# Patient Record
Sex: FEMALE | Race: WHITE | Employment: OTHER | URBAN - METROPOLITAN AREA
[De-identification: names, ages, dates, MRNs, and addresses within clinical notes are randomized per-mention and may not be internally consistent; named-entity substitution may affect disease eponyms.]

---

## 2018-12-15 ENCOUNTER — HOSPITAL ENCOUNTER (EMERGENCY)
Age: 73
Discharge: HOME | End: 2018-12-15
Attending: EMERGENCY MEDICINE
Payer: MEDICARE

## 2018-12-15 VITALS
HEART RATE: 90 BPM | HEIGHT: 66 IN | RESPIRATION RATE: 18 BRPM | WEIGHT: 280 LBS | SYSTOLIC BLOOD PRESSURE: 217 MMHG | DIASTOLIC BLOOD PRESSURE: 82 MMHG | OXYGEN SATURATION: 97 % | BODY MASS INDEX: 45 KG/M2 | TEMPERATURE: 98.3 F

## 2018-12-15 DIAGNOSIS — Z51.89 VISIT FOR WOUND CHECK: Primary | ICD-10-CM

## 2018-12-15 LAB
BASOPHILS ABSOLUTE: 0.05 E9/L (ref 0–0.2)
BASOPHILS RELATIVE PERCENT: 1.1 % (ref 0–2)
EOSINOPHILS ABSOLUTE: 0.14 E9/L (ref 0.05–0.5)
EOSINOPHILS RELATIVE PERCENT: 3 % (ref 0–6)
HCT VFR BLD CALC: 37.3 % (ref 34–48)
HEMOGLOBIN: 12.2 G/DL (ref 11.5–15.5)
IMMATURE GRANULOCYTES #: 0.01 E9/L
IMMATURE GRANULOCYTES %: 0.2 % (ref 0–5)
LYMPHOCYTES ABSOLUTE: 0.92 E9/L (ref 1.5–4)
LYMPHOCYTES RELATIVE PERCENT: 20 % (ref 20–42)
MCH RBC QN AUTO: 27.5 PG (ref 26–35)
MCHC RBC AUTO-ENTMCNC: 32.7 % (ref 32–34.5)
MCV RBC AUTO: 84.2 FL (ref 80–99.9)
MONOCYTES ABSOLUTE: 0.5 E9/L (ref 0.1–0.95)
MONOCYTES RELATIVE PERCENT: 10.8 % (ref 2–12)
NEUTROPHILS ABSOLUTE: 2.99 E9/L (ref 1.8–7.3)
NEUTROPHILS RELATIVE PERCENT: 64.9 % (ref 43–80)
PDW BLD-RTO: 15.4 FL (ref 11.5–15)
PLATELET # BLD: 120 E9/L (ref 130–450)
PMV BLD AUTO: 9 FL (ref 7–12)
RBC # BLD: 4.43 E12/L (ref 3.5–5.5)
WBC # BLD: 4.6 E9/L (ref 4.5–11.5)

## 2018-12-15 PROCEDURE — 99283 EMERGENCY DEPT VISIT LOW MDM: CPT

## 2018-12-15 PROCEDURE — 85025 COMPLETE CBC W/AUTO DIFF WBC: CPT

## 2018-12-15 RX ORDER — SODIUM CHLORIDE 0.9 % (FLUSH) 0.9 %
10 SYRINGE (ML) INJECTION PRN
Status: DISCONTINUED | OUTPATIENT
Start: 2018-12-15 | End: 2018-12-15 | Stop reason: HOSPADM

## 2018-12-15 ASSESSMENT — ENCOUNTER SYMPTOMS
EYE REDNESS: 0
NAUSEA: 0
EYE DISCHARGE: 0
SORE THROAT: 0
SINUS PRESSURE: 0
WHEEZING: 0
SHORTNESS OF BREATH: 0
EYE PAIN: 0
BACK PAIN: 0
COUGH: 0
VOMITING: 0
DIARRHEA: 0
ABDOMINAL DISTENTION: 0

## 2018-12-15 NOTE — ED PROVIDER NOTES
follow-up. New Prescriptions    No medications on file       Diagnosis:  1. Visit for wound check        Disposition:  Patient's disposition: Discharge to home  Patient's condition is stable.          Flor Fontaine [place]  23/96/24 1098

## 2018-12-26 ENCOUNTER — HOSPITAL ENCOUNTER (OUTPATIENT)
Dept: WOUND CARE | Age: 73
Discharge: HOME | End: 2018-12-26
Payer: MEDICARE

## 2018-12-26 PROCEDURE — 99202 OFFICE O/P NEW SF 15 MIN: CPT

## 2018-12-27 NOTE — FLOWSHEET NOTE
physically assist patient in ambulating into treatment room, and/or on off chair/bed. Requires assistance to bathroom. []   2   Full Assistance Requires assistance of at least two staff members to transfer patient into treatment room and/or on/off bed/chair. \"Total Transfer\". Unable to use bathroom requires bedside commode and/or bedpan []   3       Teaching Effort Documented in [facility] Clinical Note  Effort Definition Points   No Teaching  []   0   General Initial/Simple lesson:  Assess readiness to learn, assess patient learning style to determine educational flow/special needs for learning. Teaching related to 1-3 topics  Documentation in [facility] completed. []   1   Intermediate Assess readiness to learn, assess patient learning style to determine educational flow/special needs for learning. Teaching related to 3-4 topics. Hernia belt application and care considerations  Documentation in [facility] completed. [x]   2   Complex Assess readiness to learn, assess patient learning style to determine educational flow/special needs for learning. Teaching of greater than 5 additional topics   Pre-operative ostomy education with review of written resources for patient/family/caregiver as needed. Demonstration/return demonstration of ostomy irrigation  Documentation in [facility] completed. []   3     Patient Assessment and Planning in [facility] Clinical Note   Planning Definition Points   Simple Simple pouch change procedure completed and reviewed with patient/family/caregiver   Documentation in [facility] completed. [x]   1   Intermediate Moderate level of follow-up needs:   Pouch change/discharge procedure revised and reviewed with patient/caregiver. Communications with outside resources; i.e. Telephone calls to Surgeon/ PCP, family/caregiver, home health, ECF. Documentation in [facility] completed.      []   2   Complex Complex level of instructions/changes:   Family/Caregiver learning/demonstration/return

## 2019-01-30 ENCOUNTER — HOSPITAL ENCOUNTER (OUTPATIENT)
Dept: MAMMOGRAPHY | Age: 74
Discharge: HOME | End: 2019-02-01
Payer: MEDICARE

## 2019-01-30 DIAGNOSIS — Z12.39 BREAST CANCER SCREENING: ICD-10-CM

## 2019-01-30 PROCEDURE — 77067 SCR MAMMO BI INCL CAD: CPT

## 2020-01-13 ENCOUNTER — HOSPITAL ENCOUNTER (OUTPATIENT)
Dept: ULTRASOUND IMAGING | Age: 75
Discharge: HOME | End: 2020-01-15
Payer: MEDICARE

## 2020-01-13 ENCOUNTER — HOSPITAL ENCOUNTER (OUTPATIENT)
Age: 75
Discharge: HOME | End: 2020-01-15
Payer: MEDICARE

## 2020-01-13 PROCEDURE — 93970 EXTREMITY STUDY: CPT

## 2020-02-12 ENCOUNTER — HOSPITAL ENCOUNTER (OUTPATIENT)
Dept: MAMMOGRAPHY | Age: 75
Discharge: HOME | End: 2020-02-14
Payer: MEDICARE

## 2020-02-12 PROCEDURE — 77063 BREAST TOMOSYNTHESIS BI: CPT

## 2020-03-04 ENCOUNTER — HOSPITAL ENCOUNTER (OUTPATIENT)
Dept: WOUND CARE | Age: 75
Discharge: HOME | End: 2020-03-04
Payer: MEDICARE

## 2020-03-04 PROCEDURE — 99213 OFFICE O/P EST LOW 20 MIN: CPT

## 2020-03-04 NOTE — FLOWSHEET NOTE
Clinical Level of Care Assessment    Outpatient Ostomy Care      NAME:  Maia Mtz  YOB: 1945  MEDICAL RECORD NUMBER:  [id number]   DATE:  3/4/2020      Patient Kamini Richardson Assessment- Document in Flowsheet I&O   Points   Review of chart []   0   Assess Complete Ostomy tab in Navigator for assessment of; stoma status, peristomal skin, presence of hernia/stool consistency/diet/related medications   Simple adjustments to pouch size/pouch system, new stoma pattern, accessory addition/deletion. []   1   Assess Complete Ostomy tab in Navigator for assessment of; stoma status, peristomal skin, presence of hernia/stool consistency/diet/related medications   Moderate adjustments to pouch size/pouch system, new stoma pattern, accessory addition/deletion. Observe patient/caregiver with hands-on care. 1-2 adjustments to pouch size/system/skin care/accessory addition or deletion. [x]   2   Assess Complete Ostomy tab in Navigator for assessment of; stoma status, peristomal skin, presence of hernia/stool consistency/diet/related medications   Complex adjustments to pouch size/pouch system, new stoma pattern, accessory addition/deletion. 3 or more complex adjustments to pouch size/system/skin care/accessory addition or deletion. Observe patient/caregiver with hands-on care. Assess patient/patient abdomen for optimal pre-marked stoma site. Assess patient abdomen for type of hernia belt/accessory needed. []   3         Ambulation Status Documented in [facility] Clinical Note  Status Definition Points   Independent Independently able to ambulate. Fully able (without any assistance) to get on/off exam table/chair. []   0   Minimal Physical Assistance Requires physical assistance of one person to ambulate and/or position patient to be examined. Includes necessary physical assistance to position lower extremities on/off stool.    [x]   1   Moderate Physical Assistance Requires at least one staff member to physically visit. Pouching/discharge procedure revised/reviewed with patient/family/caregiver. Contact with outside resources; i.e. communication with Surgeon/ PCP, home health, ECF. Contact/referral to ostomy appliance supplier for new or additional products. Review when to call [facility] or schedule follow-up visit. Referral to Emergency Department   Documentation in [facility] completed. []   3       Is this the Patient's First Visit with [facility] @ [facility]? No    Is this Patient Established to this [facility] within the last 3 years? Yes             Clinical Level of Care      Points  0-3  Level 1 []     Points  4-6  Level 2 []     Points  7-8  Level 3 [x]     Points  9-10  Level 4 []     Points  11-12  Level 5 []      03/04/20 1330   Wound 03/04/20 Abdomen Upper;Left   Date First Assessed/Time First Assessed: 03/04/20 1330   Present on Hospital Admission: Yes  Location: Abdomen  Wound Location Orientation: Upper;Left   Dressing Changed Changed/New   Dressing/Treatment Alginate;Hydrocolloid   Wound Cleansed Soap and water   Wound Length (cm) 1 cm   Wound Width (cm) 1 cm   Wound Depth (cm) 0.2 cm   Wound Surface Area (cm^2) 1 cm^2   Wound Volume (cm^3) 0.2 cm^3   Wound Assessment Red   Drainage Amount Scant   Drainage Description Serosanguinous   Odor None   Mira-wound Assessment Intact   Red%Wound Bed 100   Colostomy LUQ   No Placement Date or Time found. Pre-existing: Yes  Location: LUQ   Stomal Appliance 1 piece; Changed  (with convexity)   Stoma  Assessment Protrudes; Moist;Red   Peristomal Assessment   (peristomal wound)   Treatment Bag change  (skin care, opticell and duoderm to wound)   Stool Appearance Formed   Stool Color Brown   Stool Amount Small   patient using 1 piece convex pouching system  When patient sits stoma depresses, 3/9 divot in  Wound present at 10 o'clock- instructed to fill with pouch, follow with jose romeo  Will follow up next week for evaluation of

## 2020-03-11 ENCOUNTER — HOSPITAL ENCOUNTER (OUTPATIENT)
Dept: WOUND CARE | Age: 75
Discharge: HOME | End: 2020-03-11
Payer: MEDICARE

## 2020-03-11 PROCEDURE — 99212 OFFICE O/P EST SF 10 MIN: CPT

## 2020-03-11 NOTE — FLOWSHEET NOTE
Clinical Level of Care Assessment    Outpatient Ostomy Care      NAME:  Mayra Mancuso  YOB: 1945  MEDICAL RECORD NUMBER:  [id number]   DATE:  3/11/2020      Patient Leann Panda Assessment- Document in Flowsheet I&O   Points   Review of chart []   0   Assess Complete Ostomy tab in Navigator for assessment of; stoma status, peristomal skin, presence of hernia/stool consistency/diet/related medications   Simple adjustments to pouch size/pouch system, new stoma pattern, accessory addition/deletion. [x]   1   Assess Complete Ostomy tab in Navigator for assessment of; stoma status, peristomal skin, presence of hernia/stool consistency/diet/related medications   Moderate adjustments to pouch size/pouch system, new stoma pattern, accessory addition/deletion. Observe patient/caregiver with hands-on care. 1-2 adjustments to pouch size/system/skin care/accessory addition or deletion. []   2   Assess Complete Ostomy tab in Navigator for assessment of; stoma status, peristomal skin, presence of hernia/stool consistency/diet/related medications   Complex adjustments to pouch size/pouch system, new stoma pattern, accessory addition/deletion. 3 or more complex adjustments to pouch size/system/skin care/accessory addition or deletion. Observe patient/caregiver with hands-on care. Assess patient/patient abdomen for optimal pre-marked stoma site. Assess patient abdomen for type of hernia belt/accessory needed. []   3         Ambulation Status Documented in [facility] Clinical Note  Status Definition Points   Independent Independently able to ambulate. Fully able (without any assistance) to get on/off exam table/chair. []   0   Minimal Physical Assistance Requires physical assistance of one person to ambulate and/or position patient to be examined. Includes necessary physical assistance to position lower extremities on/off stool.    [x]   1   Moderate Physical Assistance Requires at least one staff member to physically assist patient in ambulating into treatment room, and/or on off chair/bed. Requires assistance to bathroom. []   2   Full Assistance Requires assistance of at least two staff members to transfer patient into treatment room and/or on/off bed/chair. \"Total Transfer\". Unable to use bathroom requires bedside commode and/or bedpan []   3       Teaching Effort Documented in [facility] Clinical Note  Effort Definition Points   No Teaching  []   0   General Initial/Simple lesson:  Assess readiness to learn, assess patient learning style to determine educational flow/special needs for learning. Teaching related to 1-3 topics  Documentation in [facility] completed. []   1   Intermediate Assess readiness to learn, assess patient learning style to determine educational flow/special needs for learning. Teaching related to 3-4 topics. Hernia belt application and care considerations  Documentation in [facility] completed. [x]   2   Complex Assess readiness to learn, assess patient learning style to determine educational flow/special needs for learning. Teaching of greater than 5 additional topics   Pre-operative ostomy education with review of written resources for patient/family/caregiver as needed. Demonstration/return demonstration of ostomy irrigation  Documentation in [facility] completed. []   3     Patient Assessment and Planning in [facility] Clinical Note   Planning Definition Points   Simple Simple pouch change procedure completed and reviewed with patient/family/caregiver   Documentation in [facility] completed. [x]   1   Intermediate Moderate level of follow-up needs:   Pouch change/discharge procedure revised and reviewed with patient/caregiver. Communications with outside resources; i.e. Telephone calls to Surgeon/ PCP, family/caregiver, home health, ECF. Documentation in [facility] completed.      []   2   Complex Complex level of instructions/changes:   Family/Caregiver learning/demonstration/return demonstration visit. Pouching/discharge procedure revised/reviewed with patient/family/caregiver. Contact with outside resources; i.e. communication with Surgeon/ PCP, home health, ECF. Contact/referral to ostomy appliance supplier for new or additional products. Review when to call [facility] or schedule follow-up visit. Referral to Emergency Department   Documentation in [facility] completed. []   3       Is this the Patient's First Visit with [facility] @ [facility]? No    Is this Patient Established to this [facility] within the last 3 years? 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   Date First Assessed/Time First Assessed: 03/04/20 1330   Present on Hospital Admission: Yes  Wound Description (Comments): evans-stomal wound   Wound Image    Dressing Changed Changed/New   Dressing/Treatment Alginate;Hydrocolloid   Wound Cleansed   (water)   Wound Length (cm) 1 cm   Wound Width (cm) 1 cm   Wound Depth (cm) 0.1 cm   Wound Surface Area (cm^2) 1 cm^2   Change in Wound Size % (l*w) 0   Wound Volume (cm^3) 0.1 cm^3   Wound Healing % 50   Wound Assessment Red   Drainage Amount Scant   Drainage Description Serosanguinous   Odor None   Evans-wound Assessment Intact   Non-staged Wound Description Partial thickness   Red%Wound Bed 100   Colostomy LUQ   No Placement Date or Time found. Pre-existing: Yes  Location: LUQ   Stomal Appliance 1 piece; Changed  (with convexity)   Stoma  Assessment Protrudes; Moist;Red   Peristomal Assessment   (evans-stomal wound 9 o'clock)   Treatment Bag change  (opticell, exuderm)   Stool Appearance Formed   Stool Color Brown   Stool Amount Small     **Informed Consent**    The patient has given verbal consent to have photos taken of wound and inserted into their chart as part of their permanent medical record for purposes of documentation, treatment management and/or medical review. All Images taken on 3/11/20 of patient name: Cassandra Gray were transmitted and stored on secured [employer] located within [employer] by a registered [employer] Device.    Using one piece convex with barrier strips  States pouch lasting 2-3 days   Mira-stomal wound improved from last visit  Will follow up in 2 weeks    Electronically signed by Conner Burton RN on 3/11/2020 at 4:09 PM

## 2020-10-19 ENCOUNTER — HOSPITAL ENCOUNTER (OUTPATIENT)
Dept: WOUND CARE | Age: 75
Discharge: HOME | End: 2020-10-19
Payer: MEDICARE

## 2020-10-19 PROCEDURE — 99212 OFFICE O/P EST SF 10 MIN: CPT

## 2020-10-19 NOTE — FLOWSHEET NOTE
Clinical Level of Care Assessment    Outpatient Ostomy Care      NAME:  Jeri Morgan  YOB: 1945  MEDICAL RECORD NUMBER:  [id number]   DATE:  10/19/2020      Patient Lisa Barron Assessment- Document in Flowsheet I&O   Points   Review of chart []   0   Assess Complete Ostomy tab in Navigator for assessment of; stoma status, peristomal skin, presence of hernia/stool consistency/diet/related medications   Simple adjustments to pouch size/pouch system, new stoma pattern, accessory addition/deletion. [x]   1   Assess Complete Ostomy tab in Navigator for assessment of; stoma status, peristomal skin, presence of hernia/stool consistency/diet/related medications   Moderate adjustments to pouch size/pouch system, new stoma pattern, accessory addition/deletion. Observe patient/caregiver with hands-on care. 1-2 adjustments to pouch size/system/skin care/accessory addition or deletion. []   2   Assess Complete Ostomy tab in Navigator for assessment of; stoma status, peristomal skin, presence of hernia/stool consistency/diet/related medications   Complex adjustments to pouch size/pouch system, new stoma pattern, accessory addition/deletion. 3 or more complex adjustments to pouch size/system/skin care/accessory addition or deletion. Observe patient/caregiver with hands-on care. Assess patient/patient abdomen for optimal pre-marked stoma site. Assess patient abdomen for type of hernia belt/accessory needed. []   3         Ambulation Status Documented in [facility] Clinical Note  Status Definition Points   Independent Independently able to ambulate. Fully able (without any assistance) to get on/off exam table/chair. []   0   Minimal Physical Assistance Requires physical assistance of one person to ambulate and/or position patient to be examined. Includes necessary physical assistance to position lower extremities on/off stool.    [x]   1   Moderate Physical Assistance Requires at least one staff member to physically assist patient in ambulating into treatment room, and/or on off chair/bed. Requires assistance to bathroom. []   2   Full Assistance Requires assistance of at least two staff members to transfer patient into treatment room and/or on/off bed/chair. \"Total Transfer\". Unable to use bathroom requires bedside commode and/or bedpan []   3       Teaching Effort Documented in [facility] Clinical Note  Effort Definition Points   No Teaching  []   0   General Initial/Simple lesson:  Assess readiness to learn, assess patient learning style to determine educational flow/special needs for learning. Teaching related to 1-3 topics  Documentation in [facility] completed. [x]   1   Intermediate Assess readiness to learn, assess patient learning style to determine educational flow/special needs for learning. Teaching related to 3-4 topics. Hernia belt application and care considerations  Documentation in [facility] completed. []   2   Complex Assess readiness to learn, assess patient learning style to determine educational flow/special needs for learning. Teaching of greater than 5 additional topics   Pre-operative ostomy education with review of written resources for patient/family/caregiver as needed. Demonstration/return demonstration of ostomy irrigation  Documentation in [facility] completed. []   3     Patient Assessment and Planning in [facility] Clinical Note   Planning Definition Points   Simple Simple pouch change procedure completed and reviewed with patient/family/caregiver   Documentation in [facility] completed. [x]   1   Intermediate Moderate level of follow-up needs:   Pouch change/discharge procedure revised and reviewed with patient/caregiver. Communications with outside resources; i.e. Telephone calls to Surgeon/ PCP, family/caregiver, home health, ECF. Documentation in [facility] completed.      []   2   Complex Complex level of instructions/changes:   Family/Caregiver learning/demonstration/return demonstration visit. Pouching/discharge procedure revised/reviewed with patient/family/caregiver. Contact with outside resources; i.e. communication with Surgeon/ PCP, home health, ECF. Contact/referral to ostomy appliance supplier for new or additional products. Review when to call [facility] or schedule follow-up visit. Referral to Emergency Department   Documentation in [facility] completed. []   3       Is this the Patient's First Visit with [facility] @ [facility]? No    Is this Patient Established to this [facility] within the last 3 years? Yes             Clinical Level of Care      Points  0-3  Level 1 []     Points  4-6  Level 2 [x]     Points  7-8  Level 3 []     Points  9-10  Level 4 []     Points  11-12  Level 5 []        10/19/20 1329   Colostomy LUQ   No Placement Date or Time found. Pre-existing: Yes  Location: LUQ   Stomal Appliance 1 piece; Changed  (with convexity)   Stoma  Assessment Protrudes; Moist;Red   Peristomal Assessment Intact   Treatment Bag change  (skin care)   Output (mL) 0 ml   Patient called voicing she was bleeding from her stoma and having clots  I instructed her to notify the physician  In turn physician want ed her to se ET nurse  No abnormal finding visibly on the outside, no induration, no pain, stoma health  Will follow prn    Electronically signed by Clint Ramey RN on 10/19/2020 at 1:30 PM

## 2020-10-22 ENCOUNTER — HOSPITAL ENCOUNTER (INPATIENT)
Age: 75
LOS: 6 days | Discharge: HOME | DRG: 378 | End: 2020-10-28
Attending: EMERGENCY MEDICINE | Admitting: FAMILY MEDICINE
Payer: MEDICARE

## 2020-10-22 ENCOUNTER — ANESTHESIA EVENT (OUTPATIENT)
Dept: ENDOSCOPY | Age: 75
DRG: 378 | End: 2020-10-22
Payer: MEDICARE

## 2020-10-22 DIAGNOSIS — D64.9 ANEMIA, UNSPECIFIED TYPE: ICD-10-CM

## 2020-10-22 DIAGNOSIS — K25.4 GASTROINTESTINAL HEMORRHAGE ASSOCIATED WITH GASTRIC ULCER: ICD-10-CM

## 2020-10-22 DIAGNOSIS — K94.21: Primary | ICD-10-CM

## 2020-10-22 PROBLEM — K92.2 GI HEMORRHAGE: Status: ACTIVE | Noted: 2020-10-22

## 2020-10-22 PROBLEM — K92.2 GI BLEED: Status: ACTIVE | Noted: 2020-10-22

## 2020-10-22 LAB
ABO/RH: NORMAL
ANION GAP SERPL CALCULATED.3IONS-SCNC: 6 MMOL/L (ref 7–16)
ANTIBODY SCREEN: NORMAL
APTT: 29.6 SEC (ref 24.5–35.1)
BASOPHILS ABSOLUTE: 0.02 E9/L (ref 0–0.2)
BASOPHILS RELATIVE PERCENT: 0.5 % (ref 0–2)
BLOOD BANK DISPENSE STATUS: NORMAL
BLOOD BANK PRODUCT CODE: NORMAL
BPU ID: NORMAL
BUN BLDV-MCNC: 10 MG/DL (ref 8–23)
CALCIUM SERPL-MCNC: 8.7 MG/DL (ref 8.6–10.2)
CHLORIDE BLD-SCNC: 105 MMOL/L (ref 98–107)
CO2: 25 MMOL/L (ref 22–29)
CREAT SERPL-MCNC: 0.7 MG/DL (ref 0.5–1)
DESCRIPTION BLOOD BANK: NORMAL
EOSINOPHILS ABSOLUTE: 0.05 E9/L (ref 0.05–0.5)
EOSINOPHILS RELATIVE PERCENT: 1.3 % (ref 0–6)
GFR AFRICAN AMERICAN: >60
GFR NON-AFRICAN AMERICAN: >60 ML/MIN/1.73
GLUCOSE BLD-MCNC: 192 MG/DL (ref 74–99)
HCT VFR BLD CALC: 24.2 % (ref 34–48)
HEMOGLOBIN: 7.6 G/DL (ref 11.5–15.5)
IMMATURE GRANULOCYTES #: 0.01 E9/L
IMMATURE GRANULOCYTES %: 0.3 % (ref 0–5)
INR BLD: 1.2
LYMPHOCYTES ABSOLUTE: 0.78 E9/L (ref 1.5–4)
LYMPHOCYTES RELATIVE PERCENT: 20.5 % (ref 20–42)
MCH RBC QN AUTO: 27.4 PG (ref 26–35)
MCHC RBC AUTO-ENTMCNC: 31.4 % (ref 32–34.5)
MCV RBC AUTO: 87.4 FL (ref 80–99.9)
MONOCYTES ABSOLUTE: 0.37 E9/L (ref 0.1–0.95)
MONOCYTES RELATIVE PERCENT: 9.7 % (ref 2–12)
NEUTROPHILS ABSOLUTE: 2.58 E9/L (ref 1.8–7.3)
NEUTROPHILS RELATIVE PERCENT: 67.7 % (ref 43–80)
PDW BLD-RTO: 15.9 FL (ref 11.5–15)
PLATELET # BLD: 130 E9/L (ref 130–450)
PMV BLD AUTO: 9.1 FL (ref 7–12)
POTASSIUM SERPL-SCNC: 3.9 MMOL/L (ref 3.5–5)
PROTHROMBIN TIME: 13.3 SEC (ref 9.3–12.4)
RBC # BLD: 2.77 E12/L (ref 3.5–5.5)
SODIUM BLD-SCNC: 136 MMOL/L (ref 132–146)
WBC # BLD: 3.8 E9/L (ref 4.5–11.5)

## 2020-10-22 PROCEDURE — 36430 TRANSFUSION BLD/BLD COMPNT: CPT

## 2020-10-22 PROCEDURE — 85025 COMPLETE CBC W/AUTO DIFF WBC: CPT

## 2020-10-22 PROCEDURE — 6370000000 HC RX 637 (ALT 250 FOR IP): Performed by: STUDENT IN AN ORGANIZED HEALTH CARE EDUCATION/TRAINING PROGRAM

## 2020-10-22 PROCEDURE — 99285 EMERGENCY DEPT VISIT HI MDM: CPT

## 2020-10-22 PROCEDURE — 85730 THROMBOPLASTIN TIME PARTIAL: CPT

## 2020-10-22 PROCEDURE — 86850 RBC ANTIBODY SCREEN: CPT

## 2020-10-22 PROCEDURE — 86900 BLOOD TYPING SEROLOGIC ABO: CPT

## 2020-10-22 PROCEDURE — 2060000000 HC ICU INTERMEDIATE R&B

## 2020-10-22 PROCEDURE — 86923 COMPATIBILITY TEST ELECTRIC: CPT

## 2020-10-22 PROCEDURE — G0378 HOSPITAL OBSERVATION PER HR: HCPCS

## 2020-10-22 PROCEDURE — 6370000000 HC RX 637 (ALT 250 FOR IP): Performed by: FAMILY MEDICINE

## 2020-10-22 PROCEDURE — 85610 PROTHROMBIN TIME: CPT

## 2020-10-22 PROCEDURE — 86901 BLOOD TYPING SEROLOGIC RH(D): CPT

## 2020-10-22 PROCEDURE — P9016 RBC LEUKOCYTES REDUCED: HCPCS

## 2020-10-22 PROCEDURE — 80048 BASIC METABOLIC PNL TOTAL CA: CPT

## 2020-10-22 PROCEDURE — 2580000003 HC RX 258: Performed by: FAMILY MEDICINE

## 2020-10-22 PROCEDURE — 2580000003 HC RX 258: Performed by: EMERGENCY MEDICINE

## 2020-10-22 RX ORDER — CHOLECALCIFEROL (VITAMIN D3) 50 MCG
2000 TABLET ORAL DAILY
Status: DISCONTINUED | OUTPATIENT
Start: 2020-10-22 | End: 2020-10-28 | Stop reason: HOSPADM

## 2020-10-22 RX ORDER — ONDANSETRON 2 MG/ML
4 INJECTION INTRAMUSCULAR; INTRAVENOUS EVERY 6 HOURS PRN
Status: DISCONTINUED | OUTPATIENT
Start: 2020-10-22 | End: 2020-10-28 | Stop reason: HOSPADM

## 2020-10-22 RX ORDER — PROMETHAZINE HYDROCHLORIDE 25 MG/1
12.5 TABLET ORAL EVERY 6 HOURS PRN
Status: DISCONTINUED | OUTPATIENT
Start: 2020-10-22 | End: 2020-10-28 | Stop reason: HOSPADM

## 2020-10-22 RX ORDER — SODIUM CHLORIDE 0.9 % (FLUSH) 0.9 %
10 SYRINGE (ML) INJECTION EVERY 12 HOURS SCHEDULED
Status: DISCONTINUED | OUTPATIENT
Start: 2020-10-22 | End: 2020-10-28 | Stop reason: HOSPADM

## 2020-10-22 RX ORDER — ACETAMINOPHEN 325 MG/1
650 TABLET ORAL EVERY 6 HOURS PRN
Status: DISCONTINUED | OUTPATIENT
Start: 2020-10-22 | End: 2020-10-28 | Stop reason: HOSPADM

## 2020-10-22 RX ORDER — POLYETHYLENE GLYCOL 3350 17 G/17G
17 POWDER, FOR SOLUTION ORAL DAILY PRN
Status: DISCONTINUED | OUTPATIENT
Start: 2020-10-22 | End: 2020-10-28 | Stop reason: HOSPADM

## 2020-10-22 RX ORDER — METFORMIN HYDROCHLORIDE 500 MG/1
500 TABLET, EXTENDED RELEASE ORAL 2 TIMES DAILY
Status: DISCONTINUED | OUTPATIENT
Start: 2020-10-22 | End: 2020-10-25

## 2020-10-22 RX ORDER — ACETAMINOPHEN 650 MG/1
650 SUPPOSITORY RECTAL EVERY 6 HOURS PRN
Status: DISCONTINUED | OUTPATIENT
Start: 2020-10-22 | End: 2020-10-28 | Stop reason: HOSPADM

## 2020-10-22 RX ORDER — 0.9 % SODIUM CHLORIDE 0.9 %
20 INTRAVENOUS SOLUTION INTRAVENOUS ONCE
Status: COMPLETED | OUTPATIENT
Start: 2020-10-22 | End: 2020-10-22

## 2020-10-22 RX ORDER — SODIUM CHLORIDE 0.9 % (FLUSH) 0.9 %
10 SYRINGE (ML) INJECTION PRN
Status: DISCONTINUED | OUTPATIENT
Start: 2020-10-22 | End: 2020-10-28 | Stop reason: HOSPADM

## 2020-10-22 RX ORDER — HYDRALAZINE HYDROCHLORIDE 20 MG/ML
10 INJECTION INTRAMUSCULAR; INTRAVENOUS EVERY 6 HOURS PRN
Status: DISCONTINUED | OUTPATIENT
Start: 2020-10-22 | End: 2020-10-28 | Stop reason: HOSPADM

## 2020-10-22 RX ORDER — METFORMIN HYDROCHLORIDE 500 MG/1
500 TABLET, EXTENDED RELEASE ORAL 2 TIMES DAILY
Status: ON HOLD | COMMUNITY
End: 2020-10-28 | Stop reason: HOSPADM

## 2020-10-22 RX ADMIN — METFORMIN HYDROCHLORIDE 500 MG: 500 TABLET, EXTENDED RELEASE ORAL at 19:56

## 2020-10-22 RX ADMIN — METFORMIN HYDROCHLORIDE 500 MG: 500 TABLET, EXTENDED RELEASE ORAL at 16:02

## 2020-10-22 RX ADMIN — Medication 10 ML: at 19:57

## 2020-10-22 RX ADMIN — MAGNESIUM CITRATE 600 ML: 1.75 LIQUID ORAL at 16:03

## 2020-10-22 RX ADMIN — MAGNESIUM CITRATE 600 ML: 1.75 LIQUID ORAL at 19:56

## 2020-10-22 RX ADMIN — BISACODYL 10 MG: 5 TABLET, COATED ORAL at 17:35

## 2020-10-22 RX ADMIN — SODIUM CHLORIDE 20 ML: 9 INJECTION, SOLUTION INTRAVENOUS at 15:19

## 2020-10-22 RX ADMIN — Medication 2000 UNITS: at 16:02

## 2020-10-22 ASSESSMENT — ENCOUNTER SYMPTOMS
EYE PAIN: 0
COUGH: 0
SHORTNESS OF BREATH: 1
WHEEZING: 0
EYE REDNESS: 0
VOMITING: 0
BACK PAIN: 0
NAUSEA: 0
SHORTNESS OF BREATH: 0
ABDOMINAL DISTENTION: 0
DIARRHEA: 0
SINUS PRESSURE: 0
SORE THROAT: 0
EYE DISCHARGE: 0

## 2020-10-22 ASSESSMENT — PAIN SCALES - GENERAL
PAINLEVEL_OUTOF10: 0

## 2020-10-22 NOTE — ED NOTES
Called blood bank, states , blood is still analyzing, and not ready      Brittney Prado RN  10/22/20 1997

## 2020-10-22 NOTE — CONSULTS
GENERAL SURGERY  CONSULT NOTE  10/22/2020    Physician Consulted: Dr. Gustavo Calzada  Reason for Consult: bleeding from colostomy   Referring Physician: Dr. Bridgette Solis    Chief Complaint   Patient presents with    Other     bleeding from stoma      HPI  Olegario Talley is a 70 y.o. female who presents for evaluation of bleeding from her colostomy. History of left colectomy for diverticular stricture in 2013 by Dr. Brina Bey - pathology benign. She is not on any anticoagulation. Bleeding started October 14 during which she was passing clots for about 4 days and then the bleeding stopped. Bleeding restarted last night and was bright red. Last scope was 5 years ago which showed a polyp. Patient does have history of colon cancer in her family. Hemoglobin 7.6 from a baseline of around 8-12. Past Medical History:   Diagnosis Date    Bowel obstruction ([address]     Diabetes mellitus (HCC)     Diverticula of colon     ROBERTSON (dyspnea on exertion)     Frequent PVCs     Hypertension     Knee gives out     surgery 8/22/17    Pre-operative cardiovascular examination        Past Surgical History:   Procedure Laterality Date    ABDOMEN SURGERY      APPENDECTOMY      COLOSTOMY      DILATION AND CURETTAGE OF UTERUS      JOINT REPLACEMENT      TONSILLECTOMY         Medications Prior to Admission:    Prior to Admission medications    Medication Sig Start Date End Date Taking? Authorizing Provider   metFORMIN (GLUCOPHAGE-XR) 500 MG extended release tablet Take 500 mg by mouth 2 times daily   Yes Historical Provider, MD   Cholecalciferol (VITAMIN D) 2000 UNITS CAPS capsule Take 1 capsule by mouth daily. Historical Provider, MD       Allergies   Allergen Reactions    Ciprofloxacin     Sulfa Antibiotics     Sulfasalazine      Other reaction(s):  Other (See Comments)  Unknown childhood       Family History   Problem Relation Age of Onset    Heart Disease Mother     Heart Disease Father     Heart Disease Paternal Grandfather

## 2020-10-22 NOTE — PROGRESS NOTES
Pouch changed to irrigation bag. Will assess in am for irrigation  Kaylin Cole.  Edna Flores, CNS, Wound Care

## 2020-10-22 NOTE — PROGRESS NOTES
Database complete. Medications reconciled. Care plans and education initiated. Has colostomy. Wears CPAP nightly.

## 2020-10-22 NOTE — ANESTHESIA PRE PROCEDURE
Department of Anesthesiology  Preprocedure Note       Name:  Arabella Fink   Age:  70 y.o.  :  1945                                          MRN:  [id number]         Date:  10/22/2020      Surgeon: Arin Delatorre):  Emmy Boykin MD    Procedure: Procedure(s):  COLONOSCOPY DIAGNOSTIC    Medications prior to admission:   Prior to Admission medications    Medication Sig Start Date End Date Taking? Authorizing Provider   metFORMIN (GLUCOPHAGE-XR) 500 MG extended release tablet Take 500 mg by mouth 2 times daily   Yes Historical Provider, MD   Cholecalciferol (VITAMIN D) 2000 UNITS CAPS capsule Take 1 capsule by mouth daily. Historical Provider, MD       Current medications:    Current Facility-Administered Medications   Medication Dose Route Frequency Provider Last Rate Last Dose    vitamin D (CHOLECALCIFEROL) tablet 2,000 Units  2,000 Units Oral Daily Reddy Hollins MD   2,000 Units at 10/22/20 1602    metFORMIN (GLUCOPHAGE-XR) extended release tablet 500 mg  500 mg Oral BID Reddy Hollins MD   500 mg at 10/22/20 1602    sodium chloride flush 0.9 % injection 10 mL  10 mL Intravenous 2 times per day Reddy Hollins MD        sodium chloride flush 0.9 % injection 10 mL  10 mL Intravenous PRN Reddy Hollins MD        acetaminophen (TYLENOL) tablet 650 mg  650 mg Oral Q6H PRN Reddy Hollins MD        Or   [facility] acetaminophen (TYLENOL) suppository 650 mg  650 mg Rectal Q6H PRN Reddy Hollins MD        polyethylene glycol (GLYCOLAX) packet 17 g  17 g Oral Daily PRN Reddy Hollins MD        promethazine (PHENERGAN) tablet 12.5 mg  12.5 mg Oral Q6H PRN Reddy Hollins MD        Or    ondansetron [facility]) injection 4 mg  4 mg Intravenous Q6H PRN Reddy Hollins MD        magnesium citrate solution 600 mL  600 mL Oral Once Joan Boyer MD        hydrALAZINE (APRESOLINE) injection 10 mg  10 mg Intravenous Q6H PRN Reddy Hollins MD           Allergies:     Allergies   Allergen Reactions    Ciprofloxacin     Sulfa Antibiotics     Sulfasalazine      Other reaction(s): Other (See Comments)  Unknown childhood       Problem List:    Patient Active Problem List   Diagnosis Code    Gastroenteritis K52.9    Abscess, liver K75.0    Diabetes mellitus ([facility] [address] E11.9    Hypertension I10    Abdominal pain R10.9    Anemia, chronic disease D63.8    ROBERTSON (dyspnea on exertion) R06.00    Frequent PVCs I49.3    GI hemorrhage K92.2    GI bleed K92.2       Past Medical History:        Diagnosis Date    Bowel obstruction (HCC)     Diabetes mellitus ([facility] [address]     Diverticula of colon     ROBERTSON (dyspnea on exertion)     Frequent PVCs     Hypertension     Knee gives out     surgery 8/22/17    Pre-operative cardiovascular examination        Past Surgical History:        Procedure Laterality Date    ABDOMEN SURGERY      APPENDECTOMY      COLOSTOMY      DILATION AND CURETTAGE OF UTERUS      JOINT REPLACEMENT      TONSILLECTOMY         Social History:    Social History     Tobacco Use    Smoking status: Never Smoker    Smokeless tobacco: Never Used   Substance Use Topics    Alcohol use: No                                Counseling given: Not Answered      Vital Signs (Current):   Vitals:    10/22/20 1417 10/22/20 1430 10/22/20 1456 10/22/20 1733   BP: (!) 171/75 (!) 181/76 (!) 180/77 133/66   Pulse: 78 77 73 77   Resp: 18 18 18 18   Temp: 98.2 °F (36.8 °C) 98 °F (36.7 °C) 97.9 °F (36.6 °C) 98 °F (36.7 °C)   TempSrc: Oral Oral Oral Oral   SpO2: 99% 98% 98% 98%   Weight:       Height:                                                  BP Readings from Last 3 Encounters:   10/22/20 133/66   12/15/18 (!) 217/82   07/18/17 122/70       NPO Status:  greater than 8 hours                                                                               BMI:   Wt Readings from Last 3 Encounters:   10/22/20 290 lb (131.5 kg)   12/15/18 280 lb (127 kg)   07/18/17 283 lb 11.2 oz (128.7 kg)     Body mass index is 46.81 kg/m².     CBC:   Lab Results

## 2020-10-22 NOTE — PROGRESS NOTES
Dr. Kevin Appl notified of elevated BP, patient does not take BP meds at home.  Orders were received, see MAR

## 2020-10-23 ENCOUNTER — ANESTHESIA (OUTPATIENT)
Dept: ENDOSCOPY | Age: 75
DRG: 378 | End: 2020-10-23
Payer: MEDICARE

## 2020-10-23 VITALS
RESPIRATION RATE: 26 BRPM | DIASTOLIC BLOOD PRESSURE: 53 MMHG | OXYGEN SATURATION: 97 % | SYSTOLIC BLOOD PRESSURE: 97 MMHG

## 2020-10-23 LAB
ALBUMIN SERPL-MCNC: 3.4 G/DL (ref 3.5–5.2)
ALP BLD-CCNC: 80 U/L (ref 35–104)
ALT SERPL-CCNC: 14 U/L (ref 0–32)
ANION GAP SERPL CALCULATED.3IONS-SCNC: 9 MMOL/L (ref 7–16)
AST SERPL-CCNC: 29 U/L (ref 0–31)
BILIRUB SERPL-MCNC: 0.7 MG/DL (ref 0–1.2)
BUN BLDV-MCNC: 6 MG/DL (ref 8–23)
CALCIUM SERPL-MCNC: 8.8 MG/DL (ref 8.6–10.2)
CHLORIDE BLD-SCNC: 107 MMOL/L (ref 98–107)
CO2: 21 MMOL/L (ref 22–29)
CREAT SERPL-MCNC: 0.7 MG/DL (ref 0.5–1)
GFR AFRICAN AMERICAN: >60
GFR NON-AFRICAN AMERICAN: >60 ML/MIN/1.73
GLUCOSE BLD-MCNC: 168 MG/DL (ref 74–99)
HCT VFR BLD CALC: 24.9 % (ref 34–48)
HCT VFR BLD CALC: 25.8 % (ref 34–48)
HCT VFR BLD CALC: 26.6 % (ref 34–48)
HEMOGLOBIN: 8 G/DL (ref 11.5–15.5)
HEMOGLOBIN: 8.2 G/DL (ref 11.5–15.5)
HEMOGLOBIN: 8.2 G/DL (ref 11.5–15.5)
MCH RBC QN AUTO: 27.5 PG (ref 26–35)
MCHC RBC AUTO-ENTMCNC: 31.8 % (ref 32–34.5)
MCV RBC AUTO: 86.6 FL (ref 80–99.9)
PDW BLD-RTO: 15.9 FL (ref 11.5–15)
PLATELET # BLD: 158 E9/L (ref 130–450)
PMV BLD AUTO: 9.5 FL (ref 7–12)
POTASSIUM SERPL-SCNC: 3.8 MMOL/L (ref 3.5–5)
RBC # BLD: 2.98 E12/L (ref 3.5–5.5)
SODIUM BLD-SCNC: 137 MMOL/L (ref 132–146)
TOTAL PROTEIN: 6.5 G/DL (ref 6.4–8.3)
WBC # BLD: 5.7 E9/L (ref 4.5–11.5)

## 2020-10-23 PROCEDURE — 7100000010 HC PHASE II RECOVERY - FIRST 15 MIN: Performed by: SURGERY

## 2020-10-23 PROCEDURE — 85018 HEMOGLOBIN: CPT

## 2020-10-23 PROCEDURE — 3609027000 HC COLONOSCOPY: Performed by: SURGERY

## 2020-10-23 PROCEDURE — 2580000003 HC RX 258: Performed by: NURSE ANESTHETIST, CERTIFIED REGISTERED

## 2020-10-23 PROCEDURE — 6360000002 HC RX W HCPCS: Performed by: NURSE ANESTHETIST, CERTIFIED REGISTERED

## 2020-10-23 PROCEDURE — 85014 HEMATOCRIT: CPT

## 2020-10-23 PROCEDURE — 36415 COLL VENOUS BLD VENIPUNCTURE: CPT

## 2020-10-23 PROCEDURE — 80053 COMPREHEN METABOLIC PANEL: CPT

## 2020-10-23 PROCEDURE — 2580000003 HC RX 258: Performed by: FAMILY MEDICINE

## 2020-10-23 PROCEDURE — 7100000011 HC PHASE II RECOVERY - ADDTL 15 MIN: Performed by: SURGERY

## 2020-10-23 PROCEDURE — 6370000000 HC RX 637 (ALT 250 FOR IP): Performed by: SURGERY

## 2020-10-23 PROCEDURE — 85027 COMPLETE CBC AUTOMATED: CPT

## 2020-10-23 PROCEDURE — 3700000001 HC ADD 15 MINUTES (ANESTHESIA): Performed by: SURGERY

## 2020-10-23 PROCEDURE — 2580000003 HC RX 258: Performed by: SURGERY

## 2020-10-23 PROCEDURE — 2709999900 HC NON-CHARGEABLE SUPPLY: Performed by: SURGERY

## 2020-10-23 PROCEDURE — 0DJD8ZZ INSPECTION OF LOWER INTESTINAL TRACT, VIA NATURAL OR ARTIFICIAL OPENING ENDOSCOPIC: ICD-10-PCS | Performed by: SURGERY

## 2020-10-23 PROCEDURE — 3700000000 HC ANESTHESIA ATTENDED CARE: Performed by: SURGERY

## 2020-10-23 PROCEDURE — 2060000000 HC ICU INTERMEDIATE R&B

## 2020-10-23 PROCEDURE — 6370000000 HC RX 637 (ALT 250 FOR IP): Performed by: FAMILY MEDICINE

## 2020-10-23 RX ORDER — PROPOFOL 10 MG/ML
INJECTION, EMULSION INTRAVENOUS PRN
Status: DISCONTINUED | OUTPATIENT
Start: 2020-10-23 | End: 2020-10-23 | Stop reason: SDUPTHER

## 2020-10-23 RX ORDER — SODIUM CHLORIDE 9 MG/ML
INJECTION, SOLUTION INTRAVENOUS CONTINUOUS PRN
Status: DISCONTINUED | OUTPATIENT
Start: 2020-10-23 | End: 2020-10-23 | Stop reason: SDUPTHER

## 2020-10-23 RX ADMIN — PROPOFOL 50 MG: 10 INJECTION, EMULSION INTRAVENOUS at 14:58

## 2020-10-23 RX ADMIN — PROPOFOL 30 MG: 10 INJECTION, EMULSION INTRAVENOUS at 15:09

## 2020-10-23 RX ADMIN — PROPOFOL 20 MG: 10 INJECTION, EMULSION INTRAVENOUS at 14:55

## 2020-10-23 RX ADMIN — PROPOFOL 50 MG: 10 INJECTION, EMULSION INTRAVENOUS at 15:08

## 2020-10-23 RX ADMIN — PROPOFOL 130 MG: 10 INJECTION, EMULSION INTRAVENOUS at 14:53

## 2020-10-23 RX ADMIN — Medication 10 ML: at 08:21

## 2020-10-23 RX ADMIN — ACETAMINOPHEN 650 MG: 325 TABLET ORAL at 08:17

## 2020-10-23 RX ADMIN — Medication 10 ML: at 20:47

## 2020-10-23 RX ADMIN — METFORMIN HYDROCHLORIDE 500 MG: 500 TABLET, EXTENDED RELEASE ORAL at 20:47

## 2020-10-23 RX ADMIN — SODIUM CHLORIDE: 9 INJECTION, SOLUTION INTRAVENOUS at 14:40

## 2020-10-23 RX ADMIN — METFORMIN HYDROCHLORIDE 500 MG: 500 TABLET, EXTENDED RELEASE ORAL at 17:16

## 2020-10-23 RX ADMIN — PROPOFOL 50 MG: 10 INJECTION, EMULSION INTRAVENOUS at 15:03

## 2020-10-23 RX ADMIN — Medication 2000 UNITS: at 17:16

## 2020-10-23 ASSESSMENT — PAIN SCALES - GENERAL: PAINLEVEL_OUTOF10: 0

## 2020-10-23 NOTE — H&P
History and Physical    CHIEF COMPLAINT: GI bleed    HISTORY OF PRESENT ILLNESS:    The patient is a 70 y.o. female patient who presented to the ER secondary to bleeding from her colostomy. Patient with a history of left colectomy secondary to diverticular stricture in 2013 by Dr. Luke Barth. Patient was doing well until about 10 days ago when she started having some Intermittent bleeding from her ostomy. Symptoms worsened over the past 4 days during which she was passing clots. She therefore presented to the emergency room and is now admitted for further evaluation and treatment. No fever, chills, nausea, vomiting, and/or diarrhea. Remainder of the infectious disease review of systems is negative. Patient denies headache, change in vision, chest pain, shortness of breath, palpitations, syncope, lower extremity edema, and or any neurological signs or symptoms. Past Medical History:    Past Medical History:   Diagnosis Date    Bowel obstruction ([address]     Diabetes mellitus ([facility] [address]     Diverticula of colon     ROBERTSON (dyspnea on exertion)     Frequent PVCs     Hypertension     Knee gives out     surgery 8/22/17    Pre-operative cardiovascular examination      Past Surgical History:    Past Surgical History:   Procedure Laterality Date    ABDOMEN SURGERY      APPENDECTOMY      COLOSTOMY      DILATION AND CURETTAGE OF UTERUS      JOINT REPLACEMENT      TONSILLECTOMY       Medications Prior to Admission:    Medications Prior to Admission: metFORMIN (GLUCOPHAGE-XR) 500 MG extended release tablet, Take 500 mg by mouth 2 times daily  Cholecalciferol (VITAMIN D) 2000 UNITS CAPS capsule, Take 1 capsule by mouth daily. Allergies:    Ciprofloxacin; Sulfa antibiotics; and Sulfasalazine    Social History:    reports that she has never smoked. She has never used smokeless tobacco. She reports that she does not drink alcohol or use drugs.     Family History:   family history includes Heart Disease in her father, mother, and paternal grandfather. REVIEW OF SYSTEMS:  As above in the HPI, otherwise negative    PHYSICAL EXAM:    Vitals:  BP (!) 147/67   Pulse 74   Temp 98.3 °F (36.8 °C) (Oral)   Resp 18   Ht 5' 6\" (1.676 m)   Wt 290 lb (131.5 kg)   SpO2 99%   BMI 46.81 kg/m²     General:  Awake, alert, oriented X 3. Well developed, well nourished. No apparent distress. HEENT:  Normocephalic, atraumatic. Pupils equal, round, reactive. No scleral icterus. No conjunctival injection. Oral and nasal mucosa normal in appearance. Oropharynx is clear. MMM. Neck:  Supple without LAD, thyromegaly, or bruits. Heart:  RRR without murmurs, gallops, rubs  Lungs:  CTA bilaterally without wheezes, rales, or rhonchi. Symmetrical expansion. Abdomen: Soft, NT/ND, no masses, or organomegaly. Bowel sounds present and normoactive. Ostomy with clear output. Extremities:  No clubbing, cyanosis, or edema. No tenderness to palpation. Skin:  Warm and dry, no open lesions or rash  Neuro:  Cranial nerves 2-12 intact, no focal deficits  Breast: deferred  Rectal: deferred  Genitalia:  deferred    LABS:  As per chart and reviewed. ASSESSMENT:    1. GI bleed  2. DM2    PLAN:  1. Admit monitor bed. 2. Consult general surgery. 3. See orders. 4. Home when stable.     Viva Caller  7:24 AM  10/23/2020

## 2020-10-23 NOTE — PROGRESS NOTES
GENERAL SURGERY  DAILY PROGRESS NOTE  10/23/2020  CC: hematochezia    Subjective:  Clear ostomy output    Objective:  BP (!) 147/67   Pulse 74   Temp 98.3 °F (36.8 °C) (Oral)   Resp 18   Ht 5' 6\" (1.676 m)   Wt 290 lb (131.5 kg)   SpO2 99%   BMI 46.81 kg/m²     GENERAL:  Laying in bed, awake, alert, cooperative, no apparent distress  HEAD: Normocephalic, atraumatic  EYES: No sclera icterus, pupils equal  LUNGS:  No increased work of breathing  CARDIOVASCULAR:  RR  ABDOMEN:  Soft, non-tender, non-distended, ostomy with clear output  EXTREMITIES: No edema or swelling=  SKIN: Warm and dry    Assessment/Plan:  70 y.o. female hematochezia    Colonoscopy today  Continue to monitor H+H    Electronically signed by Prieto Pryor MD on 10/23/2020 at 5:56 AM

## 2020-10-23 NOTE — PROGRESS NOTES
Given acetaminophen (PO) for complaint of headache, consulted RN if acceptable to give with sips of water (colonoscopy this afternoon). RN permitted acetaminophen administration. Electronically signed by Blayne Sheehan on 10/23/2020 at 8:41 AM ksu. s.n.

## 2020-10-23 NOTE — CARE COORDINATION
CM NOTE: Per QFR--- admitted with GIB, hematochezia. +IVF. NPO & prepping for colonoscopy later today.

## 2020-10-23 NOTE — CARE COORDINATION
CM NOTE: CM met with pt & friend Nikia Grant) to discuss role, anticipated LOS & current plan of care. Reports living alone in 1 story home & uses cane & WW to get around. Reports being able to use steps as needed. Does not use O2. Is diabetic on oral medication. Has glucometer & testing supplies & tests BGL as directed by PCP. No hx RUDY but did have home care through [facility] in the past. Discussed dx, prep & pending procedure. Plan is home when ready for discharge. Declines need for HHC. CM & SW will continue to follow pt.

## 2020-10-23 NOTE — OP NOTE
Operative Note      Patient: Amy Andrade  YOB: 1945  MRN: [id number]    Date of Procedure: 10/23/2020    Pre-Op Diagnosis: Bleeding from colostomy    Post-Op Diagnosis: No source of bleeding from colon       Procedure(s):  COLONOSCOPY DIAGNOSTIC through colostomy    Surgeon(s):  Metro Cluster, MD    Assistant:   * No surgical staff found *    Anesthesia: Monitor Anesthesia Care    Estimated Blood Loss (mL): 0    Complications: none    Specimens:   * No specimens in log *    Implants:  * No implants in log *      Drains:   Colostomy LUQ (Active)   Stomal Appliance 1 piece;Dry; Intact 10/22/20 1456   Stoma  Assessment Moist;Red 10/23/20 0921   Peristomal Assessment Intact 10/19/20 1329   Treatment Bag change 10/19/20 1329   Stool Appearance Watery 10/23/20 0624   Stool Color Yellow 10/23/20 0624   Stool Amount Large 10/23/20 0341   Output (mL) 300 ml 10/23/20 2259       BRIEF HISTORY:  This is a 70 y.o. female who presents with the complaint of bleeding from colostomy. It was recommended the patient undergo a colonoscopy. The risks/benefits/alternatives/expected outcomes were explained the the patient which include, but are not limited, bleeding, perforation, aspiration and further procedures. The patient understands and agreed to proceed. PROCEDURE:  The patient was brought into the endoscopy suite and placed in the supine position. A colonoscope was inserted into the patient's colostomy and passed through the transverse, and ascending colon all the way to the level of the cecum. Visualization of the cecum was confirmed by visualization of the ileo-cecal valve, confluence of the tinea and appendiceal orifice. The scope was then withdrawn the entire length of the colon. There were no masses, polyps, lesions or areas of bleeding noted. Good bowel prep was identified. The patient tolerated the procedure well and there were no complications.         Electronically signed by Be Guadarrama MD on 10/23/2020 at 3:14 PM

## 2020-10-23 NOTE — PROGRESS NOTES
Per staff and patient. She is well prepped for colonoscopy. Irrigation sleeve 2 piece pouch remains intact  Bonifacio Adams.  Lawson Lopez, CNS, Wound Care

## 2020-10-24 LAB
HCT VFR BLD CALC: 24.9 % (ref 34–48)
HCT VFR BLD CALC: 25.1 % (ref 34–48)
HCT VFR BLD CALC: 27 % (ref 34–48)
HEMOGLOBIN: 7.9 G/DL (ref 11.5–15.5)
HEMOGLOBIN: 8.1 G/DL (ref 11.5–15.5)
HEMOGLOBIN: 8.1 G/DL (ref 11.5–15.5)

## 2020-10-24 PROCEDURE — 36415 COLL VENOUS BLD VENIPUNCTURE: CPT

## 2020-10-24 PROCEDURE — 85018 HEMOGLOBIN: CPT

## 2020-10-24 PROCEDURE — 85014 HEMATOCRIT: CPT

## 2020-10-24 PROCEDURE — 2580000003 HC RX 258: Performed by: SURGERY

## 2020-10-24 PROCEDURE — 2060000000 HC ICU INTERMEDIATE R&B

## 2020-10-24 PROCEDURE — 6370000000 HC RX 637 (ALT 250 FOR IP): Performed by: SURGERY

## 2020-10-24 RX ADMIN — Medication 10 ML: at 21:17

## 2020-10-24 RX ADMIN — METFORMIN HYDROCHLORIDE 500 MG: 500 TABLET, EXTENDED RELEASE ORAL at 21:17

## 2020-10-24 RX ADMIN — Medication 10 ML: at 10:30

## 2020-10-24 ASSESSMENT — PAIN SCALES - GENERAL: PAINLEVEL_OUTOF10: 0

## 2020-10-25 LAB
ALBUMIN SERPL-MCNC: 3.2 G/DL (ref 3.5–5.2)
ALP BLD-CCNC: 86 U/L (ref 35–104)
ALT SERPL-CCNC: 15 U/L (ref 0–32)
ANION GAP SERPL CALCULATED.3IONS-SCNC: 9 MMOL/L (ref 7–16)
AST SERPL-CCNC: 31 U/L (ref 0–31)
BASOPHILS ABSOLUTE: 0.02 E9/L (ref 0–0.2)
BASOPHILS RELATIVE PERCENT: 0.4 % (ref 0–2)
BILIRUB SERPL-MCNC: 0.8 MG/DL (ref 0–1.2)
BILIRUBIN DIRECT: 0.2 MG/DL (ref 0–0.3)
BILIRUBIN, INDIRECT: 0.6 MG/DL (ref 0–1)
BUN BLDV-MCNC: 5 MG/DL (ref 8–23)
CALCIUM SERPL-MCNC: 8.7 MG/DL (ref 8.6–10.2)
CHLORIDE BLD-SCNC: 106 MMOL/L (ref 98–107)
CHOLESTEROL, TOTAL: 115 MG/DL (ref 0–199)
CO2: 23 MMOL/L (ref 22–29)
CREAT SERPL-MCNC: 0.7 MG/DL (ref 0.5–1)
EOSINOPHILS ABSOLUTE: 0.24 E9/L (ref 0.05–0.5)
EOSINOPHILS RELATIVE PERCENT: 5.2 % (ref 0–6)
FERRITIN: 39 NG/ML
FOLATE: 10.5 NG/ML (ref 4.8–24.2)
GFR AFRICAN AMERICAN: >60
GFR NON-AFRICAN AMERICAN: >60 ML/MIN/1.73
GLUCOSE BLD-MCNC: 128 MG/DL (ref 74–99)
HBA1C MFR BLD: 7.4 % (ref 4–5.6)
HCT VFR BLD CALC: 26.2 % (ref 34–48)
HDLC SERPL-MCNC: 43 MG/DL
HEMOGLOBIN: 8.3 G/DL (ref 11.5–15.5)
IMMATURE GRANULOCYTES #: 0.01 E9/L
IMMATURE GRANULOCYTES %: 0.2 % (ref 0–5)
INR BLD: 1.2
IRON SATURATION: 7 % (ref 15–50)
IRON: 23 MCG/DL (ref 37–145)
LDL CHOLESTEROL CALCULATED: 55 MG/DL (ref 0–99)
LYMPHOCYTES ABSOLUTE: 1.32 E9/L (ref 1.5–4)
LYMPHOCYTES RELATIVE PERCENT: 28.4 % (ref 20–42)
MAGNESIUM: 1.9 MG/DL (ref 1.6–2.6)
MCH RBC QN AUTO: 27.1 PG (ref 26–35)
MCHC RBC AUTO-ENTMCNC: 31.7 % (ref 32–34.5)
MCV RBC AUTO: 85.6 FL (ref 80–99.9)
MONOCYTES ABSOLUTE: 0.4 E9/L (ref 0.1–0.95)
MONOCYTES RELATIVE PERCENT: 8.6 % (ref 2–12)
NEUTROPHILS ABSOLUTE: 2.65 E9/L (ref 1.8–7.3)
NEUTROPHILS RELATIVE PERCENT: 57.2 % (ref 43–80)
PDW BLD-RTO: 15.8 FL (ref 11.5–15)
PHOSPHORUS: 3.4 MG/DL (ref 2.5–4.5)
PLATELET # BLD: 165 E9/L (ref 130–450)
PMV BLD AUTO: 8.7 FL (ref 7–12)
POTASSIUM SERPL-SCNC: 3.8 MMOL/L (ref 3.5–5)
PRO-BNP: 80 PG/ML (ref 0–125)
PROTHROMBIN TIME: 13.9 SEC (ref 9.3–12.4)
RBC # BLD: 3.06 E12/L (ref 3.5–5.5)
SODIUM BLD-SCNC: 138 MMOL/L (ref 132–146)
TOTAL IRON BINDING CAPACITY: 329 MCG/DL (ref 250–450)
TOTAL PROTEIN: 6.7 G/DL (ref 6.4–8.3)
TRIGL SERPL-MCNC: 85 MG/DL (ref 0–149)
VITAMIN B-12: 644 PG/ML (ref 211–946)
VLDLC SERPL CALC-MCNC: 17 MG/DL
WBC # BLD: 4.6 E9/L (ref 4.5–11.5)

## 2020-10-25 PROCEDURE — 82746 ASSAY OF FOLIC ACID SERUM: CPT

## 2020-10-25 PROCEDURE — 82607 VITAMIN B-12: CPT

## 2020-10-25 PROCEDURE — 85025 COMPLETE CBC W/AUTO DIFF WBC: CPT

## 2020-10-25 PROCEDURE — 80061 LIPID PANEL: CPT

## 2020-10-25 PROCEDURE — 36415 COLL VENOUS BLD VENIPUNCTURE: CPT

## 2020-10-25 PROCEDURE — 80048 BASIC METABOLIC PNL TOTAL CA: CPT

## 2020-10-25 PROCEDURE — 83880 ASSAY OF NATRIURETIC PEPTIDE: CPT

## 2020-10-25 PROCEDURE — 96365 THER/PROPH/DIAG IV INF INIT: CPT

## 2020-10-25 PROCEDURE — 82728 ASSAY OF FERRITIN: CPT

## 2020-10-25 PROCEDURE — 6360000002 HC RX W HCPCS: Performed by: INTERNAL MEDICINE

## 2020-10-25 PROCEDURE — 83735 ASSAY OF MAGNESIUM: CPT

## 2020-10-25 PROCEDURE — 80076 HEPATIC FUNCTION PANEL: CPT

## 2020-10-25 PROCEDURE — 6370000000 HC RX 637 (ALT 250 FOR IP): Performed by: SURGERY

## 2020-10-25 PROCEDURE — 2580000003 HC RX 258: Performed by: INTERNAL MEDICINE

## 2020-10-25 PROCEDURE — 84100 ASSAY OF PHOSPHORUS: CPT

## 2020-10-25 PROCEDURE — 6370000000 HC RX 637 (ALT 250 FOR IP): Performed by: INTERNAL MEDICINE

## 2020-10-25 PROCEDURE — 2060000000 HC ICU INTERMEDIATE R&B

## 2020-10-25 PROCEDURE — 83550 IRON BINDING TEST: CPT

## 2020-10-25 PROCEDURE — 85610 PROTHROMBIN TIME: CPT

## 2020-10-25 PROCEDURE — 96366 THER/PROPH/DIAG IV INF ADDON: CPT

## 2020-10-25 PROCEDURE — 83540 ASSAY OF IRON: CPT

## 2020-10-25 PROCEDURE — 83036 HEMOGLOBIN GLYCOSYLATED A1C: CPT

## 2020-10-25 PROCEDURE — 2580000003 HC RX 258: Performed by: SURGERY

## 2020-10-25 RX ORDER — ALOGLIPTIN 6.25 MG/1
6.25 TABLET, FILM COATED ORAL DAILY
Status: DISCONTINUED | OUTPATIENT
Start: 2020-10-25 | End: 2020-10-28 | Stop reason: HOSPADM

## 2020-10-25 RX ORDER — FERROUS SULFATE 325(65) MG
325 TABLET ORAL 2 TIMES DAILY WITH MEALS
Status: DISCONTINUED | OUTPATIENT
Start: 2020-10-25 | End: 2020-10-28 | Stop reason: HOSPADM

## 2020-10-25 RX ADMIN — SODIUM CHLORIDE 25 MG: 9 INJECTION, SOLUTION INTRAVENOUS at 12:43

## 2020-10-25 RX ADMIN — Medication 2000 UNITS: at 08:46

## 2020-10-25 RX ADMIN — FERROUS SULFATE TAB 325 MG (65 MG ELEMENTAL FE) 325 MG: 325 (65 FE) TAB at 12:28

## 2020-10-25 RX ADMIN — Medication 10 ML: at 20:23

## 2020-10-25 RX ADMIN — SODIUM CHLORIDE 100 MG: 9 INJECTION, SOLUTION INTRAVENOUS at 14:44

## 2020-10-25 RX ADMIN — FERROUS SULFATE TAB 325 MG (65 MG ELEMENTAL FE) 325 MG: 325 (65 FE) TAB at 16:26

## 2020-10-25 RX ADMIN — Medication 10 ML: at 08:47

## 2020-10-25 RX ADMIN — ALOGLIPTIN 6.25 MG: 6.25 TABLET, FILM COATED ORAL at 14:44

## 2020-10-25 RX ADMIN — METFORMIN HYDROCHLORIDE 500 MG: 500 TABLET, EXTENDED RELEASE ORAL at 08:46

## 2020-10-25 ASSESSMENT — PAIN SCALES - GENERAL
PAINLEVEL_OUTOF10: 0
PAINLEVEL_OUTOF10: 0

## 2020-10-25 NOTE — PLAN OF CARE
Problem: Falls - Risk of:  Goal: Will remain free from falls  Description: Will remain free from falls  Outcome: Met This Shift  Goal: Absence of physical injury  Description: Absence of physical injury  Outcome: Met This Shift     Problem: Pain:  Goal: Pain level will decrease  Description: Pain level will decrease  Outcome: Met This Shift  Goal: Control of acute pain  Description: Control of acute pain  Outcome: Met This Shift     Problem: Bleeding:  Goal: Absence of active bleeding  Outcome: Met This Shift

## 2020-10-25 NOTE — PROGRESS NOTES
P Quality Flow/Interdisciplinary Rounds Progress Note        Quality Flow Rounds held on October 25, 2020    Disciplines Attending:  Bedside Nurse,  and     Stephanie Lazaro was admitted on 10/22/2020 10:59 AM    Anticipated Discharge Date:  Expected Discharge Date: 10/25/20    Disposition:    Roque Score:  Roque Scale Score: 20    Readmission Risk              Risk of Unplanned Readmission:        7           Discussed patient goal for the day, patient clinical progression, and barriers to discharge. The following Goal(s) of the Day/Commitment(s) have been identified:  monitor hemoglobin values.       Andrzej Colin  October 25, 2020

## 2020-10-25 NOTE — PROGRESS NOTES
PROGRESS  NOTE --                                                          INTERNAL  MEDICINE                                                                              I  PERSONALLY SAW , EXAMINED, AND CARED [address], 10/25/2020     LABS, XRAY ,CHART, AND MEDICATIONS  REVIEWED BY ME       Chief complaint: GI bleed      10/24/2020-SUBJECTIVE: Meliton Johns is alert awake and cooperative; oriented ×3. Denies any chest pain dyspnea nausea emesis. Tolerating diet. No abdominal pain. Patient currently n.p.o.; she states she has been told she was to have a EGD today; nothing officially in the chart as yet. Afebrile last 24 hours. Blood pressure 128/71; 98% saturation on room air. Intake and output -2034 cc. Glucose 168. Most recent hemoglobin 7.9. On admission 7.6.    10/24/2020-patient laying quietly in bed; no chest pain no dyspnea. No further blood from ostomy. Patient states she is having horrible diarrhea due to Metformin. At home, she often skips days at a time because of the severity of diarrhea. She would prefer changing to a different diabetic medication. Had significant diarrhea this a.m. already. Patient questions why EGD cannot be done while hospitalized; I have no answer for her, surgical decision. Afebrile last 24 hours. Blood pressure 131/73.  96% saturation on room air. Pulse 87. Intake and output -1874 cc. Glucose 128 BUN 5 creatinine 0.7 LDL 55 protein 6.7 albumin 3.2. A1c 7.2. Hemoglobin today 8.3 WBC 4.6. L44 folic acid normal.  TIBC normal.  However iron saturation, iron total, ferritin are all low. This would suggest chronic ongoing blood loss anemia not just recent clots per ostomy. INR 1.2. Surgical note from yesterday appreciated; possible outpatient EGD; no further bleeding from ostomy.       Objective:     PHYSICAL EXAM:    VS: /73   Pulse 87   Temp 98.1 °F (36.7 °C) 0.2 10/25/2020    IBILI 0.6 10/25/2020    LABALBU 3.2 10/25/2020     Ionized Calcium:  No results found for: IONCA  Magnesium:    Lab Results   Component Value Date    MG 1.9 10/25/2020     Phosphorus:    Lab Results   Component Value Date    PHOS 3.4 10/25/2020     Uric Acid:  No results found for: Jean Pierre Yang  PT/INR:    Lab Results   Component Value Date    PROTIME 13.9 10/25/2020    INR 1.2 10/25/2020     HgBA1c:    Lab Results   Component Value Date    LABA1C 7.4 10/25/2020     FLP:    Lab Results   Component Value Date    TRIG 85 10/25/2020    HDL 43 10/25/2020    LDLCALC 55 10/25/2020    LABVLDL 17 10/25/2020     TSH:    Lab Results   Component Value Date    TSH 3.510 07/01/2016     VITAMIN B12: No components found for: B12  FOLATE:    Lab Results   Component Value Date    FOLATE 10.5 10/25/2020     IRON:    Lab Results   Component Value Date    IRON 23 10/25/2020     Iron Saturation:  No components found for: PERCENTFE  TIBC:    Lab Results   Component Value Date    TIBC 329 10/25/2020     FERRITIN:    Lab Results   Component Value Date    FERRITIN 39 10/25/2020        General appearance: Alert, Awake, Oriented times 3, no distress  Skin: Warm and dry ; no rashes  Head: Normocephalic. No masses, lesions or tenderness noted  Eyes: Conjunctivae pale, sclera white. PERRL,EOM-I  Ears: External ears normal  Nose/Sinuses: Nares normal. Septum midline. Mucosa normal. No drainage  Oropharynx: Oropharynx clear with no exudate seen  Neck: Supple. No jugular venous distension, lymphadenopathy or thyromegaly Trachea midline  Lungs: Clear to auscultation bilaterally. No rhonchi, crackles or wheezes  Heart: S1 S2  Regular rate and rhythm. No rub, murmur or gallop  Abdomen: Soft, non-tender. BS normal. No masses, organomegaly; no rebound or guarding; colostomy left lower quadrant; no blood  Extremities: No edema, Peripheral pulses palpable  Musculoskeletal: Muscular strength appears intact.    Neuro:  No focal motor defects ; II-XII grossly intact . [place] equally    TELEMETRY: REVIEWED--Telemetry: Sinus    ASSESSMENT:   Active Problems:    Diabetes mellitus (HCC)    Hypertension    GI hemorrhage    GI bleed    Obesity, Class III, BMI 40-49.9 (morbid obesity) (HCC)    Moderate mitral regurgitation  Resolved Problems:    Liver abscess      PLAN:  SEE ORDERS      RE  CHANGES AND FINDINGS   Medications reviewed with patient  GI prophylaxis  DVT prophylaxis  Consultants notes reviewed  Iron studies  Z01 folic acid level  Pro time INR  Transfuse hemoglobin less than 7.0  proBNP  Appropriate labs  EGD today if okay with surgery service  Ferrlecit protocol due to iron deficiency anemia, chronic  Ferrous sulfate 325 mg twice daily  Transfuse hemoglobin less than 7.0  Discontinue Metformin  I spoke with pharmacy; since patient has \"sulfa allergy\" only available medication is Nesina. I spoke with pharmacy again, I would like to start with 6.25 mg once daily and progress from there. Imodium as needed for severe diarrhea  Magnesium phosphorus hepatic function panel basic metabolic panel beginning tomorrow      Discussed with patient and nursing. Gilford Ivans Mihok DO     10:32 AM     10/25/2020    TIME > 35 MINUTES    >  50 %  OF  TIME  DISCUSSION               ------------  INFORMATION  -----------      DIET:DIET GENERAL; Dental Soft        Allergies   Allergen Reactions    Ciprofloxacin     Sulfa Antibiotics     Sulfasalazine      Other reaction(s):  Other (See Comments)  Unknown childhood         MEDICATION SIDE EFFECTS:none       SCHEDULED MEDS:                                 Scheduled Meds:   vitamin D  2,000 Units Oral Daily    metFORMIN  500 mg Oral BID    sodium chloride flush  10 mL Intravenous 2 times per day       Continuous Infusions:      Data:       Intake/Output Summary (Last 24 hours) at 10/25/2020 1032  Last data filed at 10/25/2020 0130  Gross per 24 hour   Intake 120 ml   Output 200 ml   Net -80 ml       Wt Readings from Last 3 Encounters:   10/22/20 290 lb (131.5 kg)   12/15/18 280 lb (127 kg)   07/18/17 283 lb 11.2 oz (128.7 kg)       Labs: Additional    GLUCOSE:No results for input(s): POCGLU in the last 72 hours. BNP:No results found for: BNP    CRP: No results for input(s): CRP in the last 72 hours. ESR:No results for input(s): SEDRATE in the last 72 hours.     RADIOLOGY: REVIEWED AVAILABLE REPORT  No orders to display             Nicole No  DO   10:32 AM     10/25/2020      Voice recognition software used for dictation

## 2020-10-25 NOTE — PROGRESS NOTES
ferrlecit test dose infusion began at 1246. I remained with the patient for 5 minutes with initial infusion. No adverse signs and symptoms, VSS.

## 2020-10-26 ENCOUNTER — ANESTHESIA EVENT (OUTPATIENT)
Dept: ENDOSCOPY | Age: 75
DRG: 378 | End: 2020-10-26
Payer: MEDICARE

## 2020-10-26 ENCOUNTER — ANESTHESIA (OUTPATIENT)
Dept: ENDOSCOPY | Age: 75
DRG: 378 | End: 2020-10-26
Payer: MEDICARE

## 2020-10-26 VITALS
RESPIRATION RATE: 20 BRPM | OXYGEN SATURATION: 98 % | SYSTOLIC BLOOD PRESSURE: 147 MMHG | DIASTOLIC BLOOD PRESSURE: 64 MMHG

## 2020-10-26 LAB
ALBUMIN SERPL-MCNC: 3 G/DL (ref 3.5–5.2)
ALP BLD-CCNC: 80 U/L (ref 35–104)
ALT SERPL-CCNC: 12 U/L (ref 0–32)
ANION GAP SERPL CALCULATED.3IONS-SCNC: 9 MMOL/L (ref 7–16)
AST SERPL-CCNC: 32 U/L (ref 0–31)
BILIRUB SERPL-MCNC: 0.5 MG/DL (ref 0–1.2)
BILIRUBIN DIRECT: <0.2 MG/DL (ref 0–0.3)
BILIRUBIN, INDIRECT: ABNORMAL MG/DL (ref 0–1)
BUN BLDV-MCNC: 8 MG/DL (ref 8–23)
CALCIUM SERPL-MCNC: 8.5 MG/DL (ref 8.6–10.2)
CHLORIDE BLD-SCNC: 106 MMOL/L (ref 98–107)
CO2: 23 MMOL/L (ref 22–29)
CREAT SERPL-MCNC: 0.7 MG/DL (ref 0.5–1)
GFR AFRICAN AMERICAN: >60
GFR NON-AFRICAN AMERICAN: >60 ML/MIN/1.73
GLUCOSE BLD-MCNC: 132 MG/DL (ref 74–99)
HCT VFR BLD CALC: 25.5 % (ref 34–48)
HEMOGLOBIN: 8 G/DL (ref 11.5–15.5)
MAGNESIUM: 1.9 MG/DL (ref 1.6–2.6)
MCH RBC QN AUTO: 27.2 PG (ref 26–35)
MCHC RBC AUTO-ENTMCNC: 31.4 % (ref 32–34.5)
MCV RBC AUTO: 86.7 FL (ref 80–99.9)
PDW BLD-RTO: 15.9 FL (ref 11.5–15)
PHOSPHORUS: 3.2 MG/DL (ref 2.5–4.5)
PLATELET # BLD: 158 E9/L (ref 130–450)
PMV BLD AUTO: 9.2 FL (ref 7–12)
POTASSIUM SERPL-SCNC: 3.4 MMOL/L (ref 3.5–5)
RBC # BLD: 2.94 E12/L (ref 3.5–5.5)
SODIUM BLD-SCNC: 138 MMOL/L (ref 132–146)
TOTAL PROTEIN: 6.3 G/DL (ref 6.4–8.3)
WBC # BLD: 3.8 E9/L (ref 4.5–11.5)

## 2020-10-26 PROCEDURE — 2580000003 HC RX 258: Performed by: SURGERY

## 2020-10-26 PROCEDURE — 36415 COLL VENOUS BLD VENIPUNCTURE: CPT

## 2020-10-26 PROCEDURE — 2060000000 HC ICU INTERMEDIATE R&B

## 2020-10-26 PROCEDURE — 3609017100 HC EGD: Performed by: SURGERY

## 2020-10-26 PROCEDURE — 6370000000 HC RX 637 (ALT 250 FOR IP): Performed by: SURGERY

## 2020-10-26 PROCEDURE — 84100 ASSAY OF PHOSPHORUS: CPT

## 2020-10-26 PROCEDURE — 6360000002 HC RX W HCPCS: Performed by: SURGERY

## 2020-10-26 PROCEDURE — 0DJ08ZZ INSPECTION OF UPPER INTESTINAL TRACT, VIA NATURAL OR ARTIFICIAL OPENING ENDOSCOPIC: ICD-10-PCS | Performed by: SURGERY

## 2020-10-26 PROCEDURE — 6370000000 HC RX 637 (ALT 250 FOR IP): Performed by: INTERNAL MEDICINE

## 2020-10-26 PROCEDURE — 7100000010 HC PHASE II RECOVERY - FIRST 15 MIN: Performed by: SURGERY

## 2020-10-26 PROCEDURE — 96366 THER/PROPH/DIAG IV INF ADDON: CPT

## 2020-10-26 PROCEDURE — 6360000002 HC RX W HCPCS: Performed by: INTERNAL MEDICINE

## 2020-10-26 PROCEDURE — 6360000002 HC RX W HCPCS: Performed by: NURSE ANESTHETIST, CERTIFIED REGISTERED

## 2020-10-26 PROCEDURE — C9113 INJ PANTOPRAZOLE SODIUM, VIA: HCPCS | Performed by: SURGERY

## 2020-10-26 PROCEDURE — 83735 ASSAY OF MAGNESIUM: CPT

## 2020-10-26 PROCEDURE — 2580000003 HC RX 258: Performed by: NURSE ANESTHETIST, CERTIFIED REGISTERED

## 2020-10-26 PROCEDURE — 80048 BASIC METABOLIC PNL TOTAL CA: CPT

## 2020-10-26 PROCEDURE — 85027 COMPLETE CBC AUTOMATED: CPT

## 2020-10-26 PROCEDURE — 2709999900 HC NON-CHARGEABLE SUPPLY: Performed by: SURGERY

## 2020-10-26 PROCEDURE — 2580000003 HC RX 258: Performed by: INTERNAL MEDICINE

## 2020-10-26 PROCEDURE — 80076 HEPATIC FUNCTION PANEL: CPT

## 2020-10-26 PROCEDURE — 96375 TX/PRO/DX INJ NEW DRUG ADDON: CPT

## 2020-10-26 PROCEDURE — 3700000000 HC ANESTHESIA ATTENDED CARE: Performed by: SURGERY

## 2020-10-26 PROCEDURE — 7100000011 HC PHASE II RECOVERY - ADDTL 15 MIN: Performed by: SURGERY

## 2020-10-26 RX ORDER — PANTOPRAZOLE SODIUM 40 MG/1
40 TABLET, DELAYED RELEASE ORAL
Status: DISCONTINUED | OUTPATIENT
Start: 2020-10-27 | End: 2020-10-28 | Stop reason: HOSPADM

## 2020-10-26 RX ORDER — PANTOPRAZOLE SODIUM 40 MG/10ML
40 INJECTION, POWDER, LYOPHILIZED, FOR SOLUTION INTRAVENOUS 2 TIMES DAILY
Status: DISCONTINUED | OUTPATIENT
Start: 2020-10-26 | End: 2020-10-26

## 2020-10-26 RX ORDER — SODIUM CHLORIDE 9 MG/ML
INJECTION, SOLUTION INTRAVENOUS CONTINUOUS PRN
Status: DISCONTINUED | OUTPATIENT
Start: 2020-10-26 | End: 2020-10-26 | Stop reason: SDUPTHER

## 2020-10-26 RX ORDER — PROPOFOL 10 MG/ML
INJECTION, EMULSION INTRAVENOUS PRN
Status: DISCONTINUED | OUTPATIENT
Start: 2020-10-26 | End: 2020-10-26 | Stop reason: SDUPTHER

## 2020-10-26 RX ADMIN — PANTOPRAZOLE SODIUM 40 MG: 40 INJECTION, POWDER, FOR SOLUTION INTRAVENOUS at 10:34

## 2020-10-26 RX ADMIN — Medication 10 ML: at 08:58

## 2020-10-26 RX ADMIN — PROPOFOL 200 MG: 10 INJECTION, EMULSION INTRAVENOUS at 09:22

## 2020-10-26 RX ADMIN — HYDRALAZINE HYDROCHLORIDE 10 MG: 20 INJECTION, SOLUTION INTRAMUSCULAR; INTRAVENOUS at 08:58

## 2020-10-26 RX ADMIN — SODIUM CHLORIDE: 9 INJECTION, SOLUTION INTRAVENOUS at 09:14

## 2020-10-26 RX ADMIN — FERROUS SULFATE TAB 325 MG (65 MG ELEMENTAL FE) 325 MG: 325 (65 FE) TAB at 17:13

## 2020-10-26 RX ADMIN — Medication 10 ML: at 20:56

## 2020-10-26 RX ADMIN — Medication 2000 UNITS: at 10:34

## 2020-10-26 RX ADMIN — FERROUS SULFATE TAB 325 MG (65 MG ELEMENTAL FE) 325 MG: 325 (65 FE) TAB at 10:34

## 2020-10-26 RX ADMIN — SODIUM CHLORIDE 125 MG: 900 INJECTION INTRAVENOUS at 10:35

## 2020-10-26 RX ADMIN — ALOGLIPTIN 6.25 MG: 6.25 TABLET, FILM COATED ORAL at 11:27

## 2020-10-26 ASSESSMENT — PAIN SCALES - GENERAL
PAINLEVEL_OUTOF10: 0

## 2020-10-26 ASSESSMENT — ENCOUNTER SYMPTOMS: SHORTNESS OF BREATH: 1

## 2020-10-26 NOTE — ANESTHESIA POSTPROCEDURE EVALUATION
Department of Anesthesiology  Postprocedure Note    Patient: Shruthi Castrejon  MRN: [id number]  YOB: 1945  Date of evaluation: 10/26/2020  Time:  1:56 PM     Procedure Summary     Date:  10/26/20 Room / Location:  [facility]    Anesthesia Start:  9081 Anesthesia Stop:  8559    Procedure:  EGD ESOPHAGOGASTRODUODENOSCOPY (N/A ) Diagnosis:  (-)    Surgeon:  Senait Denson MD Responsible Provider:  Neville Aviles MD    Anesthesia Type:  MAC ASA Status:  3          Anesthesia Type: MAC    Debbie Phase I:      Debbie Phase II: Debbie Score: 10    Last vitals: Reviewed and per EMR flowsheets.        Anesthesia Post Evaluation    Patient location during evaluation: PACU  Patient participation: complete - patient participated  Level of consciousness: awake and alert  Airway patency: patent  Nausea & Vomiting: no nausea and no vomiting  Complications: no  Cardiovascular status: hemodynamically stable  Respiratory status: acceptable  Hydration status: euvolemic

## 2020-10-26 NOTE — PROGRESS NOTES
General Surgery Progress Note    Surgeon:  Dr. Neha Velásquez  Subjective:   Pain:    none  Diet:    Tolerated clears  Nausea: None  BM/Flatus: Ostomy output nonbloody    BP (!) 130/59   Pulse 82   Temp 98.5 °F (36.9 °C) (Oral)   Resp 14   Ht 5' 6\" (1.676 m)   Wt 290 lb (131.5 kg)   SpO2 98%   BMI 46.81 kg/m²      General:  no acute distress  Lungs:  non-labored breathing  Heart:   Pulse is regular  Abdomen:  Soft, nontender, nondistended, no guarding/rebound tenderness, ostomy functioning    Hgb:  8.3    ASSESSMENT / PLAN:   · Bleeding from stoma - no further bleeding since admission  · Hgb stable  · Ok for d/c and outpatient EGD    You Shea MD  10/26/2020  5:30 AM

## 2020-10-26 NOTE — ANESTHESIA PRE PROCEDURE
Department of Anesthesiology  Preprocedure Note       Name:  Marry Cole   Age:  70 y.o.  :  1945                                          MRN:  [id number]         Date:  10/26/2020      Surgeon: Alicia Cole):  Grazyna Kumar MD    Procedure: Procedure(s):  EGD ESOPHAGOGASTRODUODENOSCOPY    Medications prior to admission:   Prior to Admission medications    Medication Sig Start Date End Date Taking? Authorizing Provider   metFORMIN (GLUCOPHAGE-XR) 500 MG extended release tablet Take 500 mg by mouth 2 times daily    Historical Provider, MD   Cholecalciferol (VITAMIN D) 2000 UNITS CAPS capsule Take 1 capsule by mouth daily. Historical Provider, MD       Current medications:    No current facility-administered medications for this visit. No current outpatient medications on file.      Facility-Administered Medications Ordered in Other Visits   Medication Dose Route Frequency Provider Last Rate Last Dose    ferric gluconate (FERRLECIT) 125 mg in sodium chloride 0.9 % 100 mL IVPB  125 mg Intravenous Once Ivy Lauren Delarosa DO        ferrous sulfate (IRON 325) tablet 325 mg  325 mg Oral BID WC Iban Delarosa DO   325 mg at 10/25/20 1626    alogliptin (NESINA) tablet 6.25 mg  6.25 mg Oral Daily Iban Delarosa, DO   6.25 mg at 10/25/20 1444    vitamin D (CHOLECALCIFEROL) tablet 2,000 Units  2,000 Units Oral Daily Lazaro DUARTE MD   2,000 Units at 10/25/20 0846    sodium chloride flush 0.9 % injection 10 mL  10 mL Intravenous 2 times per day Lazaro DUARTE MD   10 mL at 10/25/20 2023    sodium chloride flush 0.9 % injection 10 mL  10 mL Intravenous PRN Grazyna Kumar MD        acetaminophen (TYLENOL) tablet 650 mg  650 mg Oral Q6H PRN Grazyna Kumar MD   650 mg at 10/23/20 8607    Or    acetaminophen (TYLENOL) suppository 650 mg  650 mg Rectal Q6H PRN Grazyna Kumar MD        polyethylene glycol (GLYCOLAX) packet 17 g  17 g Oral Daily PRN Grazyna Kumar MD        promethazine (PHENERGAN) tablet 12.5 mg  12.5 mg Oral Q6H PRN Usha Perdomo MD        Or    ondansetron [facility]) injection 4 mg  4 mg Intravenous Q6H PRN Usha Perdomo MD        hydrALAZINE (APRESOLINE) injection 10 mg  10 mg Intravenous Q6H PRN Usha Perdomo MD   10 mg at 10/26/20 3637       Allergies: Allergies   Allergen Reactions    Ciprofloxacin     Sulfa Antibiotics     Sulfasalazine      Other reaction(s): Other (See Comments)  Unknown childhood       Problem List:    Patient Active Problem List   Diagnosis Code    Gastroenteritis K52.9    Abscess, liver K75.0    Diabetes mellitus ([facility] E11.9    Hypertension I10    Abdominal pain R10.9    Anemia, chronic disease D63.8    ROBERTSON (dyspnea on exertion) R06.00    Frequent PVCs I49.3    GI hemorrhage K92.2    GI bleed K92.2    Obesity, Class III, BMI 40-49.9 (morbid obesity) ([facility] E66.01    Moderate mitral regurgitation I34.0       Past Medical History:        Diagnosis Date    Bowel obstruction (HCC)     Diabetes mellitus ([facility] [facility]     Diverticula of colon     ROBERTSON (dyspnea on exertion)     Frequent PVCs     Hypertension     Knee gives out     surgery 8/22/17    Liver abscess 2012    Moderate mitral regurgitation 2014    2D echo    Obesity, Class III, BMI 40-49.9 (morbid obesity) ([facility] [facility]     Pre-operative cardiovascular examination        Past Surgical History:        Procedure Laterality Date    ABDOMEN SURGERY      APPENDECTOMY      COLONOSCOPY N/A 10/23/2020    COLONOSCOPY DIAGNOSTIC performed by Usha Perdomo MD at [address]      JOINT REPLACEMENT      TONSILLECTOMY         Social History:    Social History     Tobacco Use    Smoking status: Never Smoker    Smokeless tobacco: Never Used   Substance Use Topics    Alcohol use: No                                Counseling given: Not Answered      Vital Signs (Current): There were no vitals filed for this visit. auscultation  (+) shortness of breath: no interval change,                             Cardiovascular:  Exercise tolerance: good (>4 METS),   (+) hypertension:, valvular problems/murmurs (mild-moderate MR): MR, dysrhythmias (frequent PVCs):, ROBERTSON:,       ECG reviewed  Rhythm: regular                   ROS comment: EKG 2017: NSR     Neuro/Psych:   Negative Neuro/Psych ROS              GI/Hepatic/Renal:   (+) liver disease:, morbid obesity          Endo/Other:    (+) DiabetesType II DM, , blood dyscrasia: anemia:., .                 Abdominal:   (+) obese,         Vascular: negative vascular ROS. Anesthesia Plan      MAC     ASA 3       Induction: intravenous. Anesthetic plan and risks discussed with patient. Final disposition by attending anesthesiologist.    Marc Smith MD   10/26/2020     DOS STAFF ADDENDUM:    Patient seen and chart reviewed. No interval change in history or exam.   Anesthesia plan discussed, risk/benefits addressed. Patient's concerns and questions answered.      Marc Smith MD  October 26, 2020  9:10 AM

## 2020-10-26 NOTE — PLAN OF CARE
Problem: Falls - Risk of:  Goal: Will remain free from falls  Description: Will remain free from falls  Outcome: Met This Shift  Goal: Absence of physical injury  Description: Absence of physical injury  Outcome: Met This Shift     Problem: Pain:  Goal: Pain level will decrease  Description: Pain level will decrease  Outcome: Met This Shift  Goal: Control of acute pain  Description: Control of acute pain  Outcome: Met This Shift  Goal: Control of chronic pain  Description: Control of chronic pain  Outcome: Met This Shift     Problem: Bleeding:  Goal: Absence of active bleeding  Outcome: Met This Shift     Problem: HEMODYNAMIC STATUS  Goal: Hemoglobin within specified parameters  Outcome: Met This Shift

## 2020-10-26 NOTE — PROGRESS NOTES
P Quality Flow/Interdisciplinary Rounds Progress Note        Quality Flow Rounds held on October 26, 2020    Disciplines Attending:  Bedside Nurse,  and     Lily Carrasquillo was admitted on 10/22/2020 10:59 AM    Anticipated Discharge Date:  Expected Discharge Date: 10/25/20    Disposition:    Roque Score:  Roque Scale Score: 21    Readmission Risk              Risk of Unplanned Readmission:        7           Discussed patient goal for the day, patient clinical progression, and barriers to discharge. The following Goal(s) of the Day/Commitment(s) have been identified:  EGD today.       Cherelle Lowery  October 26, 2020

## 2020-10-26 NOTE — PROGRESS NOTES
/79, patient about to be transported down to [facility] for EGD and is feeling anxious. Anesthesiologist notified of BP. Okay to give dose of prn hydralazine IV. Hydralazine administered prior to transportation.

## 2020-10-26 NOTE — PROGRESS NOTES
Family Medicine    Subjective: The patient is doing well without complaints and denies problems overnight. No further bleeding from stoma. Objective:  BP (!) 130/59   Pulse 82   Temp 98.5 °F (36.9 °C) (Oral)   Resp 14   Ht 5' 6\" (1.676 m)   Wt 290 lb (131.5 kg)   SpO2 98%   BMI 46.81 kg/m²     HEENT: MMM. Heart: RRR. Lungs: CTA bilaterally. Abd: bowel sounds present, nontender, nondistended, no masses. Extrem:  No clubbing or cyanosis. + lymphedema. Neuro: Intact without deficits. CBC with Differential:    Lab Results   Component Value Date    WBC 4.6 10/25/2020    RBC 3.06 10/25/2020    HGB 8.3 10/25/2020    HCT 26.2 10/25/2020     10/25/2020    MCV 85.6 10/25/2020    MCH 27.1 10/25/2020    MCHC 31.7 10/25/2020    RDW 15.8 10/25/2020    SEGSPCT 80 02/09/2014    LYMPHOPCT 28.4 10/25/2020    MONOPCT 8.6 10/25/2020    BASOPCT 0.4 10/25/2020    MONOSABS 0.40 10/25/2020    LYMPHSABS 1.32 10/25/2020    EOSABS 0.24 10/25/2020    BASOSABS 0.02 10/25/2020     CMP:    Lab Results   Component Value Date     10/25/2020    K 3.8 10/25/2020     10/25/2020    CO2 23 10/25/2020    BUN 5 10/25/2020    CREATININE 0.7 10/25/2020    GFRAA >60 10/25/2020    LABGLOM >60 10/25/2020    GLUCOSE 128 10/25/2020    GLUCOSE 101 04/10/2012    PROT 6.7 10/25/2020    LABALBU 3.2 10/25/2020    CALCIUM 8.7 10/25/2020    BILITOT 0.8 10/25/2020    ALKPHOS 86 10/25/2020    AST 31 10/25/2020    ALT 15 10/25/2020     Assessment/Plan:  1. GI bleed - general surgery on case. Colonoscopy normal. EGD today with nonbleeding ulcer of gastric cardia, esophagitis,  and nonbleeding varices. GI consulted to evaluate varices for banding. 2. Acute blood loss anemia - secondary to GI bleed. H/H stable. Bleeding appears to have stopped. 3. DM2 - metformin discontinued due to diarrhea. Now on Nesina. Continue same. 4. Disposition - as above. Continue same.     Franco Sim  7:24 AM  10/26/2020

## 2020-10-27 ENCOUNTER — APPOINTMENT (OUTPATIENT)
Dept: CT IMAGING | Age: 75
DRG: 378 | End: 2020-10-27
Payer: MEDICARE

## 2020-10-27 ENCOUNTER — ANESTHESIA EVENT (OUTPATIENT)
Dept: ENDOSCOPY | Age: 75
DRG: 378 | End: 2020-10-27
Payer: MEDICARE

## 2020-10-27 LAB
ALBUMIN SERPL-MCNC: 3.1 G/DL (ref 3.5–5.2)
ALP BLD-CCNC: 81 U/L (ref 35–104)
ALT SERPL-CCNC: 16 U/L (ref 0–32)
ANION GAP SERPL CALCULATED.3IONS-SCNC: 8 MMOL/L (ref 7–16)
AST SERPL-CCNC: 29 U/L (ref 0–31)
BILIRUB SERPL-MCNC: 0.6 MG/DL (ref 0–1.2)
BILIRUBIN DIRECT: <0.2 MG/DL (ref 0–0.3)
BILIRUBIN, INDIRECT: ABNORMAL MG/DL (ref 0–1)
BUN BLDV-MCNC: 6 MG/DL (ref 8–23)
CALCIUM SERPL-MCNC: 9 MG/DL (ref 8.6–10.2)
CHLORIDE BLD-SCNC: 106 MMOL/L (ref 98–107)
CO2: 24 MMOL/L (ref 22–29)
CREAT SERPL-MCNC: 0.7 MG/DL (ref 0.5–1)
GFR AFRICAN AMERICAN: >60
GFR NON-AFRICAN AMERICAN: >60 ML/MIN/1.73
GLUCOSE BLD-MCNC: 129 MG/DL (ref 74–99)
HCT VFR BLD CALC: 26.1 % (ref 34–48)
HEMOGLOBIN: 8.2 G/DL (ref 11.5–15.5)
MAGNESIUM: 1.8 MG/DL (ref 1.6–2.6)
MCH RBC QN AUTO: 27.2 PG (ref 26–35)
MCHC RBC AUTO-ENTMCNC: 31.4 % (ref 32–34.5)
MCV RBC AUTO: 86.4 FL (ref 80–99.9)
PDW BLD-RTO: 15.9 FL (ref 11.5–15)
PHOSPHORUS: 3.4 MG/DL (ref 2.5–4.5)
PLATELET # BLD: 144 E9/L (ref 130–450)
PMV BLD AUTO: 8.7 FL (ref 7–12)
POTASSIUM SERPL-SCNC: 3.5 MMOL/L (ref 3.5–5)
RBC # BLD: 3.02 E12/L (ref 3.5–5.5)
SODIUM BLD-SCNC: 138 MMOL/L (ref 132–146)
TOTAL PROTEIN: 6.4 G/DL (ref 6.4–8.3)
WBC # BLD: 3.5 E9/L (ref 4.5–11.5)

## 2020-10-27 PROCEDURE — 80048 BASIC METABOLIC PNL TOTAL CA: CPT

## 2020-10-27 PROCEDURE — 2580000003 HC RX 258: Performed by: RADIOLOGY

## 2020-10-27 PROCEDURE — 6370000000 HC RX 637 (ALT 250 FOR IP): Performed by: SURGERY

## 2020-10-27 PROCEDURE — 83735 ASSAY OF MAGNESIUM: CPT

## 2020-10-27 PROCEDURE — 2580000003 HC RX 258: Performed by: SURGERY

## 2020-10-27 PROCEDURE — 85027 COMPLETE CBC AUTOMATED: CPT

## 2020-10-27 PROCEDURE — 80076 HEPATIC FUNCTION PANEL: CPT

## 2020-10-27 PROCEDURE — 2060000000 HC ICU INTERMEDIATE R&B

## 2020-10-27 PROCEDURE — 6370000000 HC RX 637 (ALT 250 FOR IP): Performed by: FAMILY MEDICINE

## 2020-10-27 PROCEDURE — 84100 ASSAY OF PHOSPHORUS: CPT

## 2020-10-27 PROCEDURE — 36415 COLL VENOUS BLD VENIPUNCTURE: CPT

## 2020-10-27 PROCEDURE — 6370000000 HC RX 637 (ALT 250 FOR IP): Performed by: STUDENT IN AN ORGANIZED HEALTH CARE EDUCATION/TRAINING PROGRAM

## 2020-10-27 PROCEDURE — 6360000004 HC RX CONTRAST MEDICATION: Performed by: RADIOLOGY

## 2020-10-27 PROCEDURE — 74177 CT ABD & PELVIS W/CONTRAST: CPT

## 2020-10-27 RX ORDER — ALPRAZOLAM 0.25 MG/1
0.5 TABLET ORAL DAILY PRN
Status: DISCONTINUED | OUTPATIENT
Start: 2020-10-27 | End: 2020-10-28 | Stop reason: HOSPADM

## 2020-10-27 RX ORDER — SODIUM CHLORIDE 0.9 % (FLUSH) 0.9 %
10 SYRINGE (ML) INJECTION PRN
Status: DISCONTINUED | OUTPATIENT
Start: 2020-10-27 | End: 2020-10-28 | Stop reason: HOSPADM

## 2020-10-27 RX ADMIN — Medication 10 ML: at 10:59

## 2020-10-27 RX ADMIN — ALOGLIPTIN 6.25 MG: 6.25 TABLET, FILM COATED ORAL at 10:55

## 2020-10-27 RX ADMIN — ALPRAZOLAM 0.5 MG: 0.25 TABLET ORAL at 08:10

## 2020-10-27 RX ADMIN — IOPAMIDOL 75 ML: 755 INJECTION, SOLUTION INTRAVENOUS at 10:03

## 2020-10-27 RX ADMIN — FERROUS SULFATE TAB 325 MG (65 MG ELEMENTAL FE) 325 MG: 325 (65 FE) TAB at 10:55

## 2020-10-27 RX ADMIN — IOHEXOL 50 ML: 240 INJECTION, SOLUTION INTRATHECAL; INTRAVASCULAR; INTRAVENOUS; ORAL at 10:03

## 2020-10-27 RX ADMIN — Medication 2000 UNITS: at 10:55

## 2020-10-27 RX ADMIN — FERROUS SULFATE TAB 325 MG (65 MG ELEMENTAL FE) 325 MG: 325 (65 FE) TAB at 18:20

## 2020-10-27 RX ADMIN — SODIUM CHLORIDE, PRESERVATIVE FREE 10 ML: 5 INJECTION INTRAVENOUS at 08:54

## 2020-10-27 RX ADMIN — PANTOPRAZOLE SODIUM 40 MG: 40 TABLET, DELAYED RELEASE ORAL at 05:05

## 2020-10-27 ASSESSMENT — PAIN SCALES - GENERAL
PAINLEVEL_OUTOF10: 0
PAINLEVEL_OUTOF10: 0

## 2020-10-27 ASSESSMENT — ENCOUNTER SYMPTOMS: SHORTNESS OF BREATH: 1

## 2020-10-27 NOTE — PROGRESS NOTES
CT suggests noncirrhotic portal hypertension with possible cavernous transformation but on close inspection it looks no different from CT 2013. No longer need Hct q 6 h  Discussed EGD and banding, if needed . EGD first available time 1500 Wednesday

## 2020-10-27 NOTE — ANESTHESIA PRE PROCEDURE
Department of Anesthesiology  Preprocedure Note       Name:  Marry Cole   Age:  70 y.o.  :  1945                                          MRN:  [id number]         Date:  10/27/2020      Surgeon: Alicia Cole):  Abbey Hernandes MD    Procedure: Procedure(s):  EGD ESOPHAGOGASTRODUODENOSCOPYn POSSIBLE BANDING    Medications prior to admission:   Prior to Admission medications    Medication Sig Start Date End Date Taking? Authorizing Provider   metFORMIN (GLUCOPHAGE-XR) 500 MG extended release tablet Take 500 mg by mouth 2 times daily    Historical Provider, MD   Cholecalciferol (VITAMIN D) 2000 UNITS CAPS capsule Take 1 capsule by mouth daily. Historical Provider, MD       Current medications:    No current facility-administered medications for this visit. No current outpatient medications on file.      Facility-Administered Medications Ordered in Other Visits   Medication Dose Route Frequency Provider Last Rate Last Dose    ALPRAZolam Aldon Nones) tablet 0.5 mg  0.5 mg Oral Daily PRN Niecy Hernandez MD   0.5 mg at 10/27/20 0810    sodium chloride flush 0.9 % injection 10 mL  10 mL Intravenous PRN Rajesh Campbell DO   10 mL at 10/27/20 0854    pantoprazole (PROTONIX) tablet 40 mg  40 mg Oral QAM AC Kiya Reyes MD   40 mg at 10/27/20 0505    ferrous sulfate (IRON 325) tablet 325 mg  325 mg Oral BID WC Lazaro DUARTE MD   325 mg at 10/27/20 1055    alogliptin (NESINA) tablet 6.25 mg  6.25 mg Oral Daily Lazaro DUARTE MD   6.25 mg at 10/27/20 1055    vitamin D (CHOLECALCIFEROL) tablet 2,000 Units  2,000 Units Oral Daily Lazaro DUARTE MD   2,000 Units at 10/27/20 1055    sodium chloride flush 0.9 % injection 10 mL  10 mL Intravenous 2 times per day Grazyna Kumar MD   10 mL at 10/27/20 1059    sodium chloride flush 0.9 % injection 10 mL  10 mL Intravenous PRN Grazyna Kumar MD        acetaminophen (TYLENOL) tablet 650 mg  650 mg Oral Q6H PRN Grazyna Kumar MD   650 mg at 10/23/20 0817    Or    acetaminophen (TYLENOL) suppository 650 mg  650 mg Rectal Q6H PRN Sandra Chicas MD        polyethylene glycol [facility]) packet 17 g  17 g Oral Daily PRN Sandra Chicas MD        promethazine (PHENERGAN) tablet 12.5 mg  12.5 mg Oral Q6H PRN Sandra Chicas MD        Or    ondansetron [facility]) injection 4 mg  4 mg Intravenous Q6H PRN Sandra Chicas MD        hydrALAZINE (APRESOLINE) injection 10 mg  10 mg Intravenous Q6H PRN Sandra Chicas MD   10 mg at 10/26/20 1476       Allergies: Allergies   Allergen Reactions    Ciprofloxacin     Sulfa Antibiotics     Sulfasalazine      Other reaction(s):  Other (See Comments)  Unknown childhood       Problem List:    Patient Active Problem List   Diagnosis Code    Gastroenteritis K52.9    Abscess, liver K75.0    Diabetes mellitus ([facility] [address] E11.9    Hypertension I10    Abdominal pain R10.9    Anemia, chronic disease D63.8    ROBERTSON (dyspnea on exertion) R06.00    Frequent PVCs I49.3    GI hemorrhage K92.2    GI bleed K92.2    Obesity, Class III, BMI 40-49.9 (morbid obesity) ([address] E66.01    Moderate mitral regurgitation I34.0       Past Medical History:        Diagnosis Date    Bowel obstruction (HCC)     Diabetes mellitus ([address]     Diverticula of colon     ROBERTSON (dyspnea on exertion)     Frequent PVCs     Hypertension     Knee gives out     surgery 8/22/17    Liver abscess 2012    Moderate mitral regurgitation 2014    2D echo    Obesity, Class III, BMI 40-49.9 (morbid obesity) ([address]     Pre-operative cardiovascular examination        Past Surgical History:        Procedure Laterality Date    ABDOMEN SURGERY      APPENDECTOMY      COLONOSCOPY N/A 10/23/2020    COLONOSCOPY DIAGNOSTIC performed by Sandra Chicas MD at [address]      JOINT REPLACEMENT      TONSILLECTOMY      UPPER GASTROINTESTINAL ENDOSCOPY N/A 10/26/2020    EGD ESOPHAGOGASTRODUODENOSCOPY performed by Sandra Chicas MD at [address] History:    Social History     Tobacco Use    Smoking status: Never Smoker    Smokeless tobacco: Never Used   Substance Use Topics    Alcohol use: No                                Counseling given: Not Answered      Vital Signs (Current): There were no vitals filed for this visit. BP Readings from Last 3 Encounters:   10/27/20 135/60   10/26/20 (!) 147/64   10/23/20 (!) 97/53       NPO Status:  greater than 8 hours                                                                               BMI:   Wt Readings from Last 3 Encounters:   10/22/20 290 lb (131.5 kg)   12/15/18 280 lb (127 kg)   07/18/17 283 lb 11.2 oz (128.7 kg)     There is no height or weight on file to calculate BMI.    CBC:   Lab Results   Component Value Date    WBC 3.5 10/27/2020    RBC 3.02 10/27/2020    HGB 8.2 10/27/2020    HCT 26.1 10/27/2020    MCV 86.4 10/27/2020    RDW 15.9 10/27/2020     10/27/2020       CMP:   Lab Results   Component Value Date     10/27/2020    K 3.5 10/27/2020     10/27/2020    CO2 24 10/27/2020    BUN 6 10/27/2020    CREATININE 0.7 10/27/2020    GFRAA >60 10/27/2020    LABGLOM >60 10/27/2020    GLUCOSE 129 10/27/2020    GLUCOSE 101 04/10/2012    PROT 6.4 10/27/2020    CALCIUM 9.0 10/27/2020    BILITOT 0.6 10/27/2020    ALKPHOS 81 10/27/2020    AST 29 10/27/2020    ALT 16 10/27/2020       POC Tests: No results for input(s): POCGLU, POCNA, POCK, POCCL, POCBUN, POCHEMO, POCHCT in the last 72 hours.     Coags:   Lab Results   Component Value Date    PROTIME 13.9 10/25/2020    INR 1.2 10/25/2020    APTT 29.6 10/22/2020       HCG (If Applicable): No results found for: PREGTESTUR, PREGSERUM, HCG, HCGQUANT     ABGs: No results found for: PHART, PO2ART, MWV8BGB, KFQ7DGH, BEART, G5BOUJLH     Type & Screen (If Applicable):  No results found for: LABABO,

## 2020-10-27 NOTE — CONSULTS
[id number] [address]                                  CONSULTATION    PATIENT NAME: Deirdre Shaffer                       :        1945  MED REC NO:   [id number]                            ROOM:       8177  ACCOUNT NO:   [de-identified]                           ADMIT DATE: 10/22/2020  PROVIDER:     Zeus Richard MD    CONSULT DATE:  10/26/2020    REASON FOR CONSULTATION:  Recently recognized esophageal varices and an  episode of GI bleeding. HISTORY OF PRESENT ILLNESS:  She is 70. She has a history of obesity  and diabetes. Approximately six years ago, she underwent a resection of  her left colon for complications of diverticular disease. A temporary  colostomy became permanent when attempted takedown of a colostomy  resulted in apparently intraoperative hemorrhage necessitating  termination of the attempt. She has no recognized history of liver disease or jaundice. She  presented to the hospital with bleeding, which began about 10/14. She  just clearly described it as rather bright red blood, and on one  occasion prompting her visit to the emergency room with a large clot  filling her hand. Stools were never black and tarry, and there was no  nausea or vomiting. Bleeding ceased spontaneously. She was not  hypotensive at any time or symptomatic in anyway other than visible  bleeding. When she arrived for admission, she had a hemoglobin of 7.6. During this hospital stay, she was transfused with a single unit of  packed red blood cells. Her most recent hemoglobin was 8.0. She was  prepped for colonoscopy. The content cleared and has shown no  additional bleeding. Colonoscopy was devoid of a bleeding site. There  is no description of diverticular disease. The upper GI tract was then  examined today. Incidental esophageal varices without high-risk  features were identified.   The stomach and duodenum were normal.   Changes of portal hypertension were lacking in the stomach. Her BUN and creatinine on admission were normal.  She has had normal  liver function studies. Her INR 1.2. Platelet count was 321,063. MEDICATIONS:  Her medications prior to admission were limited to  metformin and vitamin D. Current in-house medications includes ferrous  sulfate, hydralazine, pantoprazole, and Phenergan. ALLERGIES:  Listed to QUINOLONES and SULFA as well as SULFASALAZINE. PAST MEDICAL HISTORY:  Includes obesity, diabetes, mitral regurgitation,  hypertension, diverticular disease with complications. She is listed as  having a liver abscess in 2012 with percutaneous aspiration of the  lesion yielded serous fluid making it a liver cyst rather than abscess. PAST SURGICAL HISTORY:  She has had a previous appendectomy, segmental  resection of her colon with a permanent colostomy, EGD, and colonoscopy. OBJECTIVE:  She is overweight, pleasant, alert, oriented, minimally  pale. NECK:  Neck veins flat. LUNGS:  Lung fields clear. HEART:  Heart tones normal.  ABDOMEN:  Colostomy. No liver edge or spleen tip. No ascites, no  edema. I have reviewed the photographs that Dr. Block Done had shown me, and there  were clearly low-risk varices, probably grade 2 in two  columns. I really do not have major suspicions that this bleeding was  upper GI tract. The passage of clots from the ostomy without hypotension or  an elevated BUN and creatinine would be very unusual.  Therefore, I  think she bled like diverticular bleeding, but it is by no means clear  she has residual diverticula. CAT scan first.  Define cirrhosis. Since she had intra-abdominal sepsis  in the past with a suspicion of liver abscess, perhaps she had portal  vein thrombosis. Endoscopic reevaluation likely to occur Wednesday and  discharge home the same day. Banding may or may not occur.         Lilly Alex MD    D: 10/26/2020 87:43:35       T: 10/26/2020 21:39:06     [email or web address]  Job#: [id number]     Doc#: [id number]    CC:

## 2020-10-27 NOTE — PROGRESS NOTES
SURGERY  DAILY PROGRESS NOTE  10/27/2020    Chief Complaint:    Chief Complaint   Patient presents with    Other     bleeding from stoma       Subjective:    Patient is doing well. No abdominal pain, nausea, vomiting. Tolerating diet after EGD yesterday. Objective:  /70   Pulse 77   Temp 98.3 °F (36.8 °C) (Oral)   Resp 16   Ht 5' 6\" (1.676 m)   Wt 290 lb (131.5 kg)   SpO2 98%   BMI 46.81 kg/m²     GENERAL:  Laying in bed, awake, alert, cooperative, no apparent distress  LUNGS:  Symmetric chest rise  CARDIOVASCULAR:  Warm and well perfused  ABDOMEN:  Soft, NT, ND, ostomy w/o gross blood  EXTREMITIES: Warm to touch, no edema    Assessment/Plan:  70 y.o. female presents with bleeding from her ostomy. Non-bloody output from ostomy today. - EGD yesterday showed superficial nonbleeding gastric cardia ulcer, mild esophagitis, nonbleeding esophageal varices  - As per GI, low risk varices/ grade 2, bleeding probably not related to upper GI tract most likely diverticular bleeding. Endoscopic reevaluation on Friday most likely and d/c after.  Awaiting CT results  - Follow up with GI   - Continue protonix   - GS will sign off at this time, please page with any questions or concerns      Electronically signed by Riana Priest MD on 10/27/2020 at 4:56 AM

## 2020-10-27 NOTE — PROGRESS NOTES
[facility] Quality Flow/Interdisciplinary Rounds Progress Note        Quality Flow Rounds held on October 27, 2020    Disciplines Attending:  Bedside Nurse, ,  and Nursing Unit Leadership    Harpal Alexandra was admitted on 10/22/2020 10:59 AM    Anticipated Discharge Date:  Expected Discharge Date: 10/25/20    Disposition:    Roque Score:  Roque Scale Score: 21    Readmission Risk              Risk of Unplanned Readmission:        9           Discussed patient goal for the day, patient clinical progression, and barriers to discharge. The following Goal(s) of the Day/Commitment(s) have been identified:  CT Today, possible EGD tomorrow. Check GI plan.        Cayetano Randle  October 27, 2020

## 2020-10-27 NOTE — PROGRESS NOTES
Family Medicine    Subjective: The patient is doing well without complaints and denies problems overnight. No further bleeding from stoma. Objective:  /70   Pulse 77   Temp 98.3 °F (36.8 °C) (Oral)   Resp 16   Ht 5' 6\" (1.676 m)   Wt 290 lb (131.5 kg)   SpO2 98%   BMI 46.81 kg/m²     HEENT: MMM. Heart: RRR. Lungs: CTA bilaterally. Abd: bowel sounds present, nontender, nondistended, no masses. Extrem:  No clubbing or cyanosis. + lymphedema. Neuro: Intact without deficits. CBC with Differential:    Lab Results   Component Value Date    WBC 3.8 10/26/2020    RBC 2.94 10/26/2020    HGB 8.0 10/26/2020    HCT 25.5 10/26/2020     10/26/2020    MCV 86.7 10/26/2020    MCH 27.2 10/26/2020    MCHC 31.4 10/26/2020    RDW 15.9 10/26/2020    SEGSPCT 80 02/09/2014    LYMPHOPCT 28.4 10/25/2020    MONOPCT 8.6 10/25/2020    BASOPCT 0.4 10/25/2020    MONOSABS 0.40 10/25/2020    LYMPHSABS 1.32 10/25/2020    EOSABS 0.24 10/25/2020    BASOSABS 0.02 10/25/2020     CMP:    Lab Results   Component Value Date     10/26/2020    K 3.4 10/26/2020     10/26/2020    CO2 23 10/26/2020    BUN 8 10/26/2020    CREATININE 0.7 10/26/2020    GFRAA >60 10/26/2020    LABGLOM >60 10/26/2020    GLUCOSE 132 10/26/2020    GLUCOSE 101 04/10/2012    PROT 6.3 10/26/2020    LABALBU 3.0 10/26/2020    CALCIUM 8.5 10/26/2020    BILITOT 0.5 10/26/2020    ALKPHOS 80 10/26/2020    AST 32 10/26/2020    ALT 12 10/26/2020     Assessment/Plan:  1. GI bleed - general surgery on case. Colonoscopy normal. EGD with nonbleeding ulcer of gastric cardia, esophagitis, and nonbleeding varices. GI consulted. CT scan today. Also EGD Wednesday to evaluate varices for banding. 2. Acute blood loss anemia - secondary to GI bleed. H/H stable. Bleeding appears to have stopped. 3. DM2 - metformin discontinued due to diarrhea. Now on Nesina. Continue same. 4. Disposition - as above. Continue same.     Heron Warren  7:26 AM  10/27/2020

## 2020-10-27 NOTE — CARE COORDINATION
Updated discharge plan of care. Pt for CT of abdomen and pelvis today. Possible EGD tomorrow with possible banding of varices. Plan for possible d/c after egd tomorrow. Pt has no needs for home, has ww, cane and all supplies.  Will follow-MJO

## 2020-10-28 ENCOUNTER — ANESTHESIA (OUTPATIENT)
Dept: ENDOSCOPY | Age: 75
DRG: 378 | End: 2020-10-28
Payer: MEDICARE

## 2020-10-28 VITALS
OXYGEN SATURATION: 100 % | BODY MASS INDEX: 46.61 KG/M2 | DIASTOLIC BLOOD PRESSURE: 74 MMHG | RESPIRATION RATE: 18 BRPM | SYSTOLIC BLOOD PRESSURE: 169 MMHG | HEIGHT: 66 IN | TEMPERATURE: 98 F | WEIGHT: 290 LBS | HEART RATE: 72 BPM

## 2020-10-28 VITALS
SYSTOLIC BLOOD PRESSURE: 144 MMHG | RESPIRATION RATE: 19 BRPM | OXYGEN SATURATION: 100 % | DIASTOLIC BLOOD PRESSURE: 69 MMHG

## 2020-10-28 LAB
ALBUMIN SERPL-MCNC: 3 G/DL (ref 3.5–5.2)
ALP BLD-CCNC: 89 U/L (ref 35–104)
ALT SERPL-CCNC: 14 U/L (ref 0–32)
ANION GAP SERPL CALCULATED.3IONS-SCNC: 8 MMOL/L (ref 7–16)
AST SERPL-CCNC: 25 U/L (ref 0–31)
BILIRUB SERPL-MCNC: 0.4 MG/DL (ref 0–1.2)
BILIRUBIN DIRECT: <0.2 MG/DL (ref 0–0.3)
BILIRUBIN, INDIRECT: ABNORMAL MG/DL (ref 0–1)
BUN BLDV-MCNC: 7 MG/DL (ref 8–23)
CALCIUM SERPL-MCNC: 8.7 MG/DL (ref 8.6–10.2)
CHLORIDE BLD-SCNC: 106 MMOL/L (ref 98–107)
CO2: 24 MMOL/L (ref 22–29)
CREAT SERPL-MCNC: 0.7 MG/DL (ref 0.5–1)
GFR AFRICAN AMERICAN: >60
GFR NON-AFRICAN AMERICAN: >60 ML/MIN/1.73
GLUCOSE BLD-MCNC: 142 MG/DL (ref 74–99)
HCT VFR BLD CALC: 25.3 % (ref 34–48)
HEMOGLOBIN: 8 G/DL (ref 11.5–15.5)
MAGNESIUM: 1.9 MG/DL (ref 1.6–2.6)
MCH RBC QN AUTO: 27.5 PG (ref 26–35)
MCHC RBC AUTO-ENTMCNC: 31.6 % (ref 32–34.5)
MCV RBC AUTO: 86.9 FL (ref 80–99.9)
PDW BLD-RTO: 16.2 FL (ref 11.5–15)
PHOSPHORUS: 3.8 MG/DL (ref 2.5–4.5)
PLATELET # BLD: 153 E9/L (ref 130–450)
PMV BLD AUTO: 9.3 FL (ref 7–12)
POTASSIUM SERPL-SCNC: 3.6 MMOL/L (ref 3.5–5)
RBC # BLD: 2.91 E12/L (ref 3.5–5.5)
SODIUM BLD-SCNC: 138 MMOL/L (ref 132–146)
TOTAL PROTEIN: 6.3 G/DL (ref 6.4–8.3)
WBC # BLD: 3.9 E9/L (ref 4.5–11.5)

## 2020-10-28 PROCEDURE — 0DJ08ZZ INSPECTION OF UPPER INTESTINAL TRACT, VIA NATURAL OR ARTIFICIAL OPENING ENDOSCOPIC: ICD-10-PCS | Performed by: INTERNAL MEDICINE

## 2020-10-28 PROCEDURE — 80048 BASIC METABOLIC PNL TOTAL CA: CPT

## 2020-10-28 PROCEDURE — 2580000003 HC RX 258: Performed by: SURGERY

## 2020-10-28 PROCEDURE — 36415 COLL VENOUS BLD VENIPUNCTURE: CPT

## 2020-10-28 PROCEDURE — 2580000003 HC RX 258: Performed by: NURSE ANESTHETIST, CERTIFIED REGISTERED

## 2020-10-28 PROCEDURE — 3700000000 HC ANESTHESIA ATTENDED CARE: Performed by: INTERNAL MEDICINE

## 2020-10-28 PROCEDURE — 3700000001 HC ADD 15 MINUTES (ANESTHESIA): Performed by: INTERNAL MEDICINE

## 2020-10-28 PROCEDURE — 84100 ASSAY OF PHOSPHORUS: CPT

## 2020-10-28 PROCEDURE — 85027 COMPLETE CBC AUTOMATED: CPT

## 2020-10-28 PROCEDURE — 6370000000 HC RX 637 (ALT 250 FOR IP): Performed by: STUDENT IN AN ORGANIZED HEALTH CARE EDUCATION/TRAINING PROGRAM

## 2020-10-28 PROCEDURE — 6360000002 HC RX W HCPCS: Performed by: NURSE ANESTHETIST, CERTIFIED REGISTERED

## 2020-10-28 PROCEDURE — 7100000011 HC PHASE II RECOVERY - ADDTL 15 MIN: Performed by: INTERNAL MEDICINE

## 2020-10-28 PROCEDURE — 6370000000 HC RX 637 (ALT 250 FOR IP): Performed by: SURGERY

## 2020-10-28 PROCEDURE — 3609017100 HC EGD: Performed by: INTERNAL MEDICINE

## 2020-10-28 PROCEDURE — 2500000003 HC RX 250 WO HCPCS: Performed by: NURSE ANESTHETIST, CERTIFIED REGISTERED

## 2020-10-28 PROCEDURE — 7100000010 HC PHASE II RECOVERY - FIRST 15 MIN: Performed by: INTERNAL MEDICINE

## 2020-10-28 PROCEDURE — 83735 ASSAY OF MAGNESIUM: CPT

## 2020-10-28 PROCEDURE — 80076 HEPATIC FUNCTION PANEL: CPT

## 2020-10-28 PROCEDURE — 2709999900 HC NON-CHARGEABLE SUPPLY: Performed by: INTERNAL MEDICINE

## 2020-10-28 RX ORDER — PROPOFOL 10 MG/ML
INJECTION, EMULSION INTRAVENOUS PRN
Status: DISCONTINUED | OUTPATIENT
Start: 2020-10-28 | End: 2020-10-28 | Stop reason: SDUPTHER

## 2020-10-28 RX ORDER — SUCRALFATE 1 G/1
1 TABLET ORAL
Qty: 120 TABLET | Refills: 3 | Status: SHIPPED | OUTPATIENT
Start: 2020-10-28

## 2020-10-28 RX ORDER — ALOGLIPTIN 6.25 MG/1
6.25 TABLET, FILM COATED ORAL DAILY
Qty: 30 TABLET | Refills: 2 | Status: SHIPPED | OUTPATIENT
Start: 2020-10-29 | End: 2021-04-21

## 2020-10-28 RX ORDER — PANTOPRAZOLE SODIUM 40 MG/1
40 TABLET, DELAYED RELEASE ORAL
Qty: 30 TABLET | Refills: 3 | Status: SHIPPED | OUTPATIENT
Start: 2020-10-29 | End: 2020-10-28

## 2020-10-28 RX ORDER — PANTOPRAZOLE SODIUM 40 MG/1
40 TABLET, DELAYED RELEASE ORAL
Qty: 30 TABLET | Refills: 3 | Status: SHIPPED | OUTPATIENT
Start: 2020-10-29

## 2020-10-28 RX ORDER — ALOGLIPTIN 6.25 MG/1
6.25 TABLET, FILM COATED ORAL DAILY
Qty: 30 TABLET | Refills: 2 | Status: SHIPPED | OUTPATIENT
Start: 2020-10-29 | End: 2020-10-28

## 2020-10-28 RX ORDER — SODIUM CHLORIDE 9 MG/ML
INJECTION, SOLUTION INTRAVENOUS CONTINUOUS PRN
Status: DISCONTINUED | OUTPATIENT
Start: 2020-10-28 | End: 2020-10-28 | Stop reason: SDUPTHER

## 2020-10-28 RX ORDER — LIDOCAINE HYDROCHLORIDE 10 MG/ML
INJECTION, SOLUTION EPIDURAL; INFILTRATION; INTRACAUDAL; PERINEURAL PRN
Status: DISCONTINUED | OUTPATIENT
Start: 2020-10-28 | End: 2020-10-28 | Stop reason: SDUPTHER

## 2020-10-28 RX ORDER — FERROUS SULFATE 325(65) MG
325 TABLET ORAL 2 TIMES DAILY WITH MEALS
Qty: 30 TABLET | Refills: 3 | Status: SHIPPED | OUTPATIENT
Start: 2020-10-29

## 2020-10-28 RX ORDER — SUCRALFATE 1 G/1
1 TABLET ORAL
Qty: 120 TABLET | Refills: 3 | Status: SHIPPED | OUTPATIENT
Start: 2020-10-28 | End: 2020-10-28 | Stop reason: SDUPTHER

## 2020-10-28 RX ORDER — FERROUS SULFATE 325(65) MG
325 TABLET ORAL 2 TIMES DAILY WITH MEALS
Qty: 30 TABLET | Refills: 3 | Status: SHIPPED | OUTPATIENT
Start: 2020-10-29 | End: 2020-10-28

## 2020-10-28 RX ADMIN — PROPOFOL 80 MG: 10 INJECTION, EMULSION INTRAVENOUS at 15:47

## 2020-10-28 RX ADMIN — FERROUS SULFATE TAB 325 MG (65 MG ELEMENTAL FE) 325 MG: 325 (65 FE) TAB at 17:27

## 2020-10-28 RX ADMIN — PROPOFOL 80 MG: 10 INJECTION, EMULSION INTRAVENOUS at 15:50

## 2020-10-28 RX ADMIN — PANTOPRAZOLE SODIUM 40 MG: 40 TABLET, DELAYED RELEASE ORAL at 05:27

## 2020-10-28 RX ADMIN — PROPOFOL 120 MG: 10 INJECTION, EMULSION INTRAVENOUS at 15:44

## 2020-10-28 RX ADMIN — PROPOFOL 40 MG: 10 INJECTION, EMULSION INTRAVENOUS at 15:48

## 2020-10-28 RX ADMIN — LIDOCAINE HYDROCHLORIDE 20 MG: 10 INJECTION, SOLUTION EPIDURAL; INFILTRATION; INTRACAUDAL; PERINEURAL at 15:44

## 2020-10-28 RX ADMIN — Medication 10 ML: at 08:35

## 2020-10-28 RX ADMIN — FERROUS SULFATE TAB 325 MG (65 MG ELEMENTAL FE) 325 MG: 325 (65 FE) TAB at 08:35

## 2020-10-28 RX ADMIN — SODIUM CHLORIDE: 9 INJECTION, SOLUTION INTRAVENOUS at 15:42

## 2020-10-28 RX ADMIN — Medication 2000 UNITS: at 08:35

## 2020-10-28 RX ADMIN — ALOGLIPTIN 6.25 MG: 6.25 TABLET, FILM COATED ORAL at 08:35

## 2020-10-28 ASSESSMENT — PAIN SCALES - GENERAL: PAINLEVEL_OUTOF10: 0

## 2020-10-28 NOTE — PROGRESS NOTES
Discharge paperwork reviewed at length with the patient and her daughter. The patient states she understands the importance of medication compliance and follow up appointments. Denies any questions at this time. Discharged.

## 2020-10-28 NOTE — PROGRESS NOTES
Patient report called to 6th floor RN; family waiting room notified of patient transfer.  Seble Velazquez

## 2020-10-28 NOTE — BRIEF OP NOTE
Brief Postoperative Note      Patient: Cheryle Palms  YOB: 1945  MRN: [id number]    Date of Procedure: 10/28/2020    Pre-Op Diagnosis: varices    Post-Op Diagnosis: begin 5 cm above GE jxn, no red spots, no banding       Procedure(s):  EGD ESOPHAGOGASTRODUODENOSCOPYn POSSIBLE BANDING    Surgeon(s):  Skye Doherty MD    Assistant:  * No surgical staff found *    Anesthesia: Monitor Anesthesia Care    Estimated Blood Loss (mL): 0    Complications: None    Specimens:   * No specimens in log *    Implants:  * No implants in log *      Drains:   Colostomy LUQ (Active)   Stomal Appliance 1 piece 10/28/20 1336   Stoma  Assessment Flush;Red 10/28/20 1336   Mucocutaneous Junction Intact 10/28/20 1336   Peristomal Assessment Intact 10/28/20 1336   Treatment Bag change 10/28/20 1336   Stool Appearance Formed 10/28/20 1336   Stool Color Brown 10/28/20 1336   Stool Amount Medium 10/28/20 1336   Output (mL) 200 ml 10/25/20 0130       Findings: low risk varices will consider B blocker    Electronically signed by Skye Doherty MD on 10/28/2020 at 4:14 PM

## 2020-10-29 NOTE — OP NOTE
[address] [address]                                OPERATIVE REPORT    PATIENT NAME: Wale Moses                       :        1945  MED REC NO:   [id number]                            ROOM:       8192  ACCOUNT NO:   [de-identified]                           ADMIT DATE: 10/22/2020  PROVIDER:     Alek Ortez MD    DATE OF PROCEDURE:  10/28/2020    PROCEDURE PERFORMED:  Esophagogastroduodenoscopy. PREOPERATIVE DIAGNOSIS:  Esophageal varices recently discovered. POSTOPERATIVE DIAGNOSES:  1. Esophageal varices deemed to be low risk of bleeding, no banding  applied, see below. 2.  Unremarkable stomach and proximal duodenum. INDICATIONS:  She is 70years of age with a history of diverticular  disease with a previous resection for same and a permanent colostomy,  was admitted to the hospital passing red blood per ostomy with clots. Hemoglobin stabilized. Bleeding ceased. She underwent a colonoscopy by  Dr. Marie Amato through the ostomy site, and having not identified a  clear-cut source of bleeding, the upper GI tract was examined. Esophageal varices were unexpectedly encountered, though the bleeding  was historically not of variceal origin. Preop is monitored anesthesia care. DESCRIPTION OF PROCEDURE:  With her left lateral and a bite block in  place, the Olympus GIF-H190 video endoscope was guided into the  oropharynx and then into the cervical esophagus. _____ cervical  esophagus was normal, but as reported, esophageal varices were  encountered in the mid esophagus. They did not extend to the  gastroesophageal junction. They were devoid of any high-risk stigmata. Estimated to be between grade 2 and grade 3. They terminated about 5 cm  above the gastroesophageal junction. The stomach and duodenum were  unremarkable.   Repeated passes were made with a decision made at this  point that these were low risk varices and probably present for years  based on previous x-rays. Since I did not extend to the  gastroesophageal junction and the absence of high-risk stigmata and a  clear-cut history that this was not a variceal hemorrhage, it was  decided not to band them at this time. Consideration will be given to  beta-blockers. PLAN:  Home okay. Office followup.         Vincent Mathews MD    D: 10/28/2020 18:35:21       T: 10/28/2020 18:43:07     [email or web address]  Job#: [id number]     Doc#: [id number]    CC:  Pratibha Garcia MD

## 2020-10-30 NOTE — ANESTHESIA POSTPROCEDURE EVALUATION
Department of Anesthesiology  Postprocedure Note    Patient: Martha Harris  MRN: [id number]  YOB: 1945  Date of evaluation: 10/29/2020  Time:  8:32 PM     Procedure Summary     Date:  10/28/20 Room / Location:  [facility] [address] / [address] [place] [address]    Anesthesia Start:  9380 Anesthesia Stop:  0762    Procedure:  EGD ESOPHAGOGASTRODUODENOSCOPY POSSIBLE BANDING (N/A ) Diagnosis:  (unk)    Surgeon:  Ella Eli MD Responsible Provider:  Sande Peabody, MD    Anesthesia Type:  MAC ASA Status:  3          Anesthesia Type: MAC    Debbie Phase I:      Debbie Phase II: Debbie Score: 10    Last vitals: Reviewed and per EMR flowsheets.        Anesthesia Post Evaluation    Patient location during evaluation: PACU  Patient participation: complete - patient participated  Level of consciousness: awake and alert  Pain score: 0  Airway patency: patent  Nausea & Vomiting: no vomiting and no nausea  Complications: no  Cardiovascular status: hemodynamically stable  Respiratory status: spontaneous ventilation  Hydration status: stable  Comments: Late entry- post op anesthesia visit on 10/28/2020

## 2020-11-02 NOTE — ANESTHESIA POSTPROCEDURE EVALUATION
Department of Anesthesiology  Postprocedure Note    Patient: Nadine Arevalo  MRN: [id number]  YOB: 1945  Date of evaluation: 11/2/2020  Time:  11:05 AM     Procedure Summary     Date:  10/23/20 Room / Location:  [facility] / [facility]    Anesthesia Start:  2577 Anesthesia Stop:  1518    Procedure:  COLONOSCOPY DIAGNOSTIC (N/A ) Diagnosis:  (-)    Surgeon:  Nilesh Mosher MD Responsible Provider:  Yaquelin Gonzalez DO    Anesthesia Type:  MAC ASA Status:  3          Anesthesia Type: MAC    Debbie Phase I:      Debbie Phase II: Debbie Score: 10    Last vitals: Reviewed and per EMR flowsheets.        Anesthesia Post Evaluation    Patient location during evaluation: PACU  Patient participation: complete - patient participated  Level of consciousness: awake and alert  Airway patency: patent  Nausea & Vomiting: no nausea and no vomiting  Complications: no  Cardiovascular status: hemodynamically stable  Respiratory status: acceptable  Hydration status: euvolemic

## 2020-11-22 NOTE — DISCHARGE SUMMARY
Physician Discharge Summary     Patient ID:  Ginger Hidalgo  [id number]  33 y.o.  1945    Admit date: 10/22/2020    Discharge date and time: 10/28/2020  7:30 PM     Admission Diagnoses: GI hemorrhage [K92.2]  GI hemorrhage [K92.2]  GI bleed [K92.2]    Discharge Diagnoses:   1. GI bleed - general surgery was on case. Colonoscopy normal. EGD with nonbleeding ulcer of gastric cardia, esophagitis, and nonbleeding varices. GI was also on case. No treatment needed for varices. CT scan without cirrhosis. 2. Acute blood loss anemia - secondary to GI bleed from peptic ulcer. H/H stable and no further bleeding at discharge. 3. DM2 - metformin discontinued due to diarrhea. Switched to [employer]. Continue same. 4. Disposition - Home on day of discharge in stable condition. Discharge Exam:  See progress note from today    Disposition: home    Patient Instructions:   Discharge Medication List as of 10/28/2020  7:12 PM      CONTINUE these medications which have CHANGED    Details   alogliptin (NESINA) 6.25 MG TABS tablet Take 1 tablet by mouth daily, Disp-30 tablet,R-2Normal      ferrous sulfate (IRON 325) 325 (65 Fe) MG tablet Take 1 tablet by mouth 2 times daily (with meals), Disp-30 tablet,R-3Normal      pantoprazole (PROTONIX) 40 MG tablet Take 1 tablet by mouth every morning (before breakfast), Disp-30 tablet,R-3Normal      sucralfate (CARAFATE) 1 GM tablet Take 1 tablet by mouth 4 times daily (before meals and nightly), Disp-120 tablet,R-3Normal         CONTINUE these medications which have NOT CHANGED    Details   Cholecalciferol (VITAMIN D) 2000 UNITS CAPS capsule Take 1 capsule by mouth daily.            STOP taking these medications       metFORMIN (GLUCOPHAGE-XR) 500 MG extended release tablet Comments:   Reason for Stopping:         ondansetron (ZOFRAN ODT) 4 MG disintegrating tablet Comments:   Reason for Stopping:         aspirin 81 MG chewable tablet Comments:   Reason for Stopping:             Activity: activity as tolerated  Diet: diabetic diet    Follow-up with Dr. Lonny Antony in 1 week.     Note that over 30 minutes was spent in preparing discharge papers, discussing discharge with patient, medication review, etc.    Signed:  Eleonora Hatch  11/22/2020  3:32 PM

## 2021-02-18 DIAGNOSIS — G47.33 OBSTRUCTIVE SLEEP APNEA (ADULT) (PEDIATRIC): Primary | ICD-10-CM

## 2021-02-24 ENCOUNTER — HOSPITAL ENCOUNTER (OUTPATIENT)
Dept: MAMMOGRAPHY | Age: 76
Discharge: HOME | End: 2021-02-26
Payer: MEDICARE

## 2021-02-24 DIAGNOSIS — Z12.31 ENCOUNTER FOR SCREENING MAMMOGRAM FOR MALIGNANT NEOPLASM OF BREAST: ICD-10-CM

## 2021-02-24 PROCEDURE — 77063 BREAST TOMOSYNTHESIS BI: CPT

## 2021-02-27 ENCOUNTER — HOSPITAL ENCOUNTER (OUTPATIENT)
Dept: SLEEP CENTER | Age: 76
Discharge: HOME | End: 2021-02-27
Payer: MEDICARE

## 2021-02-27 DIAGNOSIS — I10 HYPERTENSION, UNSPECIFIED TYPE: ICD-10-CM

## 2021-02-27 DIAGNOSIS — I49.3 FREQUENT PVCS: ICD-10-CM

## 2021-02-27 DIAGNOSIS — E66.01 OBESITY, CLASS III, BMI 40-49.9 (MORBID OBESITY) (HCC): Primary | ICD-10-CM

## 2021-02-27 PROCEDURE — 95810 POLYSOM 6/> YRS 4/> PARAM: CPT

## 2021-02-28 VITALS
HEIGHT: 66 IN | HEART RATE: 80 BPM | OXYGEN SATURATION: 97 % | SYSTOLIC BLOOD PRESSURE: 150 MMHG | WEIGHT: 289 LBS | BODY MASS INDEX: 46.45 KG/M2 | TEMPERATURE: 97.9 F | DIASTOLIC BLOOD PRESSURE: 80 MMHG

## 2021-02-28 ASSESSMENT — SLEEP AND FATIGUE QUESTIONNAIRES
HOW LIKELY ARE YOU TO NOD OFF OR FALL ASLEEP WHILE SITTING AND TALKING TO SOMEONE: 0
HOW LIKELY ARE YOU TO NOD OFF OR FALL ASLEEP WHILE SITTING INACTIVE IN A PUBLIC PLACE: 0
HOW LIKELY ARE YOU TO NOD OFF OR FALL ASLEEP WHEN YOU ARE A PASSENGER IN A CAR FOR AN HOUR WITHOUT A BREAK: 0

## 2021-03-10 DIAGNOSIS — G47.33 OBSTRUCTIVE SLEEP APNEA (ADULT) (PEDIATRIC): Primary | ICD-10-CM

## 2021-03-20 ENCOUNTER — HOSPITAL ENCOUNTER (OUTPATIENT)
Dept: SLEEP CENTER | Age: 76
Discharge: HOME | End: 2021-03-20
Payer: MEDICARE

## 2021-03-20 DIAGNOSIS — I10 HYPERTENSION, UNSPECIFIED TYPE: ICD-10-CM

## 2021-03-20 DIAGNOSIS — E66.01 OBESITY, CLASS III, BMI 40-49.9 (MORBID OBESITY) (HCC): ICD-10-CM

## 2021-03-20 DIAGNOSIS — G47.33 OSA (OBSTRUCTIVE SLEEP APNEA): Primary | ICD-10-CM

## 2021-03-20 PROCEDURE — 95811 POLYSOM 6/>YRS CPAP 4/> PARM: CPT

## 2021-03-21 VITALS
TEMPERATURE: 97.2 F | HEIGHT: 66 IN | WEIGHT: 289 LBS | OXYGEN SATURATION: 96 % | DIASTOLIC BLOOD PRESSURE: 71 MMHG | SYSTOLIC BLOOD PRESSURE: 170 MMHG | HEART RATE: 78 BPM | BODY MASS INDEX: 46.45 KG/M2

## 2021-03-24 NOTE — PROGRESS NOTES
[address] [place], [place]                               SLEEP STUDY REPORT    PATIENT NAME: Roxy Lind                       :        1945  MED REC NO:   [id number]                            ROOM:  ACCOUNT NO:   [de-identified]                           ADMIT DATE: 2021  PROVIDER:     Allison Burton MD    DATE OF STUDY:  2021    INDICATION FOR POLYSOMNOGRAPHY:  Restless sleep with positive sleep  disorder breathing. CURRENT MEDICATIONS:  Pantoprazole and Iron. BMI is 46, neck circumference 15, and ESS 3. A split study was ordered in this patient, who is in need of new CPAP  equipment. Per technician notes, the patient had a hard time staying  awake without her CPAP in place. There was a large amount of wake time  therefore during the testing. SLEEP ARCHITECTURE:  The patient spent a total of 392 minutes in bed and  slept 186 for an overall sleep efficiency of 41%. Time of sleep onset  was 17 minutes. SLEEP STAGES:  The patient was in N1 sleep for 20%, N2 sleep for 71%, N3  sleep for 8%, and had no REM sleep. During the study, positive sleep was observed with 53 obstructive events  recorded for an overall RDI of 20. Some limb motion abnormalities were  seen without causing arousals. Moderate oxyhemoglobin desaturation was seen towards the latter half of  the study. Unfortunately, the histogram does not include sleep stages. IMPRESSION:  Moderate to severe obstructive sleep apnea with a paucity  of sleep likely secondary to a lack of CPAP being in place. RECOMMENDATIONS:  Clinical correlation is advised. A trial of CPAP is  indicated and a repeat study may be necessary. This will be reviewed  with the patient in view of her insurance.         Brian Narvaez MD      D: 2021 22:01:14       T: 2021 10:20:39     [email or web address]  Job#: [id number]     Doc#: [id number]    CC:

## 2021-03-29 NOTE — PROGRESS NOTES
[address] [address]                               SLEEP STUDY REPORT    PATIENT NAME: Carter Dickey                       :        1945  MED REC NO:   [id number]                            ROOM:  ACCOUNT NO:   [de-identified]                           ADMIT DATE: 2021  PROVIDER:     Brandi Cordova MD    DATE OF STUDY:  2021    INDICATION FOR POLYSOMNOGRAPHY:  Positive airway pressure titration. Previous study done on  showed saturation _____ 85% with disturbed  sleep. BMI is 48. Neck circumference is 15. ESS is 3. Of note is the patient having difficulty falling asleep because of  cramping in both legs. Note that frequent PLMS was present. SLEEP ARCHITECTURE:  The patient spent a total of 376 minutes in bed and  slept 200 minutes with an overall sleep efficiency of 52%. Time of  sleep onset was 9 minutes. SLEEP STAGES:  The patient was in N1 sleep for 23%, N2 sleep for 70%, N3  for 2.5%, and REM sleep for 3% of the study. VENTILATION SUMMARY:  Only 1 obstructive event was recorded during the  study. Significant limb motion abnormalities were seen during the study with a  total of 203 episodes being seen. No oxyhemoglobin desaturation was seen. CPAP was titrated during the study to 5 cm of water pressure. At the  end of the study, the patient did eventually sleep. Limb motion  abnormalities were seen throughout the beginning of the study. IMPRESSION:  Mild obstructive sleep apnea, which appears to be treated  with nasal CPAP at 6 cm of water pressure. A nasal mask ResMed AirFit  P30 was used. Significant limb motion abnormalities were noted with associated leg  cramping. RECOMMENDATIONS:  The patient should be looked at for PLMS/restless  legs. In addition, to be on 6 cm of nasal CPAP as outlined above.   CBC  with diff is in order to rule out underlying anemia.         Monica Morrison MD      D: 03/28/2021 12:24:57       T: 03/29/2021 9:23:21     [email or web address]  Job#: [id number]     Doc#: [id number]    CC:

## 2021-05-20 NOTE — PROGRESS NOTES
[facility] PRE-ADMISSION TESTING INSTRUCTIONS    The Preadmission Testing patient is instructed accordingly using the following criteria (check applicable):    ARRIVAL INSTRUCTIONS:  [x] Parking the day of Surgery is located in the Main Entrance lot. Upon entering the door, make an immediate right to the surgery reception desk    [x] Bring photo ID and insurance card    [x] Bring in a copy of Living will or Durable Power of  papers. [x] Please be sure to arrange transportation to and from the hospital    [x] Please arrange for someone to be with you the remainder of the day due to having anesthesia      GENERAL INSTRUCTIONS:    [x] Nothing by mouth after midnight, including gum, candy, mints or water    [x] You may brush your teeth, but do not swallow any water    [x] Take medications as instructed with 1-2 oz of water    [x] Stop herbal supplements and vitamins 5 days prior to procedure    [x] Follow preop dosing of blood thinners per physician instructions    [] Do not take insulin or oral diabetic medications    [] If diabetic and have low blood sugar or feel symptomatic, take 1-2oz apple juice or glucose tablets    [] Bring inhalers day of surgery    [] Bring C-PAP/ Bi-Pap day of surgery    [] Bring urine specimen day of surgery    [x]  Soap shower or bath AM of Surgery, no lotion, powders or creams to surgical site    [] Follow bowel prep as instructed per surgeon    [] No tobacco products within 24 hours of surgery     [x] No alcohol or illegal drug use within 24 hours of surgery.     [x] Jewelry, body piercing's, eyeglasses, contact lenses and dentures are not permitted into surgery (bring cases)      [x] Please do not wear any nail polish or make up on the day of surgery    [x] If not already done, you can expect a call from registration    [x] If surgeon requests a time change you will be notified the day prior to surgery    [x] If you receive a survey after surgery we would greatly appreciate your comments    [] Parent/guardian of a minor must accompany their child and remain on the premises  the entire time they are under our care     [] Pediatric patients may bring favorite toy, blanket or comfort item with them    [x] A caregiver or family member must remain with the patient during their stay if they are mentally handicapped, have dementia, disoriented or unable to use a call light or would be a safety concern if left unattended    [x] Please notify surgeon if you develop any illness between now and time of surgery (cold, cough, sore throat, fever, nausea, vomiting) or any signs of infections  including skin, wounds, and dental.    [] Other instructions    EDUCATIONAL MATERIALS PROVIDED:    [] PAT Preoperative Education Packet/Booklet     [] Medication List    [] Fluoroscopy Information Pamphlet    [] Transfusion bracelet applied with instructions    [] Joint replacement video reviewed    [] Shower with antibacterial soap and use CHG wipes provided the evening before surgery as instructed        Have you been tested for COVID  Yes           Have you been told you were positive for COVID No  Have you had any known exposure to someone that is positive for COVID No  Do you have a cough                   No              Do you have shortness of breath No                 Do you have a sore throat            No                Are you having chills                    No                Are you having muscle aches. No                    Please come to the hospital wearing a mask and have your significant other wear a mask as well. Both of you should check your temperature before leaving to come here,  if it is 100 or higher please call [phone] for instruction.

## 2021-05-24 ENCOUNTER — ANESTHESIA EVENT (OUTPATIENT)
Dept: OPERATING ROOM | Age: 76
End: 2021-05-24
Payer: MEDICARE

## 2021-05-24 ASSESSMENT — ENCOUNTER SYMPTOMS: SHORTNESS OF BREATH: 1

## 2021-05-25 ENCOUNTER — ANESTHESIA (OUTPATIENT)
Dept: OPERATING ROOM | Age: 76
End: 2021-05-25
Payer: MEDICARE

## 2021-05-25 ENCOUNTER — HOSPITAL ENCOUNTER (OUTPATIENT)
Age: 76
Setting detail: OUTPATIENT SURGERY
Discharge: HOME | End: 2021-05-25
Attending: OBSTETRICS & GYNECOLOGY | Admitting: OBSTETRICS & GYNECOLOGY
Payer: MEDICARE

## 2021-05-25 VITALS
OXYGEN SATURATION: 97 % | HEART RATE: 71 BPM | DIASTOLIC BLOOD PRESSURE: 81 MMHG | SYSTOLIC BLOOD PRESSURE: 180 MMHG | WEIGHT: 293 LBS | RESPIRATION RATE: 20 BRPM | BODY MASS INDEX: 47.09 KG/M2 | TEMPERATURE: 96.8 F | HEIGHT: 66 IN

## 2021-05-25 VITALS — SYSTOLIC BLOOD PRESSURE: 194 MMHG | DIASTOLIC BLOOD PRESSURE: 91 MMHG | OXYGEN SATURATION: 99 %

## 2021-05-25 DIAGNOSIS — N95.0 PMB (POSTMENOPAUSAL BLEEDING): Primary | ICD-10-CM

## 2021-05-25 LAB
ABO/RH: NORMAL
ANTIBODY SCREEN: NORMAL
BASOPHILS ABSOLUTE: 0.03 E9/L (ref 0–0.2)
BASOPHILS RELATIVE PERCENT: 0.7 % (ref 0–2)
EOSINOPHILS ABSOLUTE: 0.16 E9/L (ref 0.05–0.5)
EOSINOPHILS RELATIVE PERCENT: 3.5 % (ref 0–6)
HCT VFR BLD CALC: 39.2 % (ref 34–48)
HEMOGLOBIN: 13.2 G/DL (ref 11.5–15.5)
IMMATURE GRANULOCYTES #: 0.01 E9/L
IMMATURE GRANULOCYTES %: 0.2 % (ref 0–5)
LYMPHOCYTES ABSOLUTE: 1.27 E9/L (ref 1.5–4)
LYMPHOCYTES RELATIVE PERCENT: 27.6 % (ref 20–42)
MCH RBC QN AUTO: 29.5 PG (ref 26–35)
MCHC RBC AUTO-ENTMCNC: 33.7 % (ref 32–34.5)
MCV RBC AUTO: 87.5 FL (ref 80–99.9)
METER GLUCOSE: 153 MG/DL (ref 74–99)
METER GLUCOSE: 166 MG/DL (ref 74–99)
MONOCYTES ABSOLUTE: 0.37 E9/L (ref 0.1–0.95)
MONOCYTES RELATIVE PERCENT: 8 % (ref 2–12)
NEUTROPHILS ABSOLUTE: 2.76 E9/L (ref 1.8–7.3)
NEUTROPHILS RELATIVE PERCENT: 60 % (ref 43–80)
PDW BLD-RTO: 15.5 FL (ref 11.5–15)
PLATELET # BLD: 115 E9/L (ref 130–450)
PMV BLD AUTO: 9.7 FL (ref 7–12)
RBC # BLD: 4.48 E12/L (ref 3.5–5.5)
WBC # BLD: 4.6 E9/L (ref 4.5–11.5)

## 2021-05-25 PROCEDURE — 6360000002 HC RX W HCPCS: Performed by: NURSE ANESTHETIST, CERTIFIED REGISTERED

## 2021-05-25 PROCEDURE — 7100000011 HC PHASE II RECOVERY - ADDTL 15 MIN: Performed by: OBSTETRICS & GYNECOLOGY

## 2021-05-25 PROCEDURE — 82962 GLUCOSE BLOOD TEST: CPT

## 2021-05-25 PROCEDURE — 7100000001 HC PACU RECOVERY - ADDTL 15 MIN: Performed by: OBSTETRICS & GYNECOLOGY

## 2021-05-25 PROCEDURE — 85025 COMPLETE CBC W/AUTO DIFF WBC: CPT

## 2021-05-25 PROCEDURE — 3600000013 HC SURGERY LEVEL 3 ADDTL 15MIN: Performed by: OBSTETRICS & GYNECOLOGY

## 2021-05-25 PROCEDURE — 36415 COLL VENOUS BLD VENIPUNCTURE: CPT

## 2021-05-25 PROCEDURE — 3700000000 HC ANESTHESIA ATTENDED CARE: Performed by: OBSTETRICS & GYNECOLOGY

## 2021-05-25 PROCEDURE — 6360000002 HC RX W HCPCS: Performed by: OBSTETRICS & GYNECOLOGY

## 2021-05-25 PROCEDURE — 86901 BLOOD TYPING SEROLOGIC RH(D): CPT

## 2021-05-25 PROCEDURE — 7100000000 HC PACU RECOVERY - FIRST 15 MIN: Performed by: OBSTETRICS & GYNECOLOGY

## 2021-05-25 PROCEDURE — 3600000003 HC SURGERY LEVEL 3 BASE: Performed by: OBSTETRICS & GYNECOLOGY

## 2021-05-25 PROCEDURE — 2709999900 HC NON-CHARGEABLE SUPPLY: Performed by: OBSTETRICS & GYNECOLOGY

## 2021-05-25 PROCEDURE — 88305 TISSUE EXAM BY PATHOLOGIST: CPT

## 2021-05-25 PROCEDURE — 86900 BLOOD TYPING SEROLOGIC ABO: CPT

## 2021-05-25 PROCEDURE — 86850 RBC ANTIBODY SCREEN: CPT

## 2021-05-25 PROCEDURE — 2580000003 HC RX 258: Performed by: OBSTETRICS & GYNECOLOGY

## 2021-05-25 PROCEDURE — 2500000003 HC RX 250 WO HCPCS: Performed by: NURSE ANESTHETIST, CERTIFIED REGISTERED

## 2021-05-25 PROCEDURE — 3700000001 HC ADD 15 MINUTES (ANESTHESIA): Performed by: OBSTETRICS & GYNECOLOGY

## 2021-05-25 PROCEDURE — 7100000010 HC PHASE II RECOVERY - FIRST 15 MIN: Performed by: OBSTETRICS & GYNECOLOGY

## 2021-05-25 RX ORDER — ONDANSETRON 2 MG/ML
INJECTION INTRAMUSCULAR; INTRAVENOUS PRN
Status: DISCONTINUED | OUTPATIENT
Start: 2021-05-25 | End: 2021-05-25 | Stop reason: SDUPTHER

## 2021-05-25 RX ORDER — FENTANYL CITRATE 50 UG/ML
INJECTION, SOLUTION INTRAMUSCULAR; INTRAVENOUS PRN
Status: DISCONTINUED | OUTPATIENT
Start: 2021-05-25 | End: 2021-05-25 | Stop reason: SDUPTHER

## 2021-05-25 RX ORDER — OXYCODONE HYDROCHLORIDE AND ACETAMINOPHEN 5; 325 MG/1; MG/1
1 TABLET ORAL
Status: DISCONTINUED | OUTPATIENT
Start: 2021-05-25 | End: 2021-05-25 | Stop reason: HOSPADM

## 2021-05-25 RX ORDER — FENTANYL CITRATE 50 UG/ML
25 INJECTION, SOLUTION INTRAMUSCULAR; INTRAVENOUS EVERY 5 MIN PRN
Status: DISCONTINUED | OUTPATIENT
Start: 2021-05-25 | End: 2021-05-25 | Stop reason: HOSPADM

## 2021-05-25 RX ORDER — PROMETHAZINE HYDROCHLORIDE 25 MG/ML
6.25 INJECTION, SOLUTION INTRAMUSCULAR; INTRAVENOUS
Status: DISCONTINUED | OUTPATIENT
Start: 2021-05-25 | End: 2021-05-25 | Stop reason: HOSPADM

## 2021-05-25 RX ORDER — LIDOCAINE HYDROCHLORIDE 20 MG/ML
INJECTION, SOLUTION EPIDURAL; INFILTRATION; INTRACAUDAL; PERINEURAL PRN
Status: DISCONTINUED | OUTPATIENT
Start: 2021-05-25 | End: 2021-05-25 | Stop reason: SDUPTHER

## 2021-05-25 RX ORDER — SODIUM CHLORIDE, SODIUM LACTATE, POTASSIUM CHLORIDE, CALCIUM CHLORIDE 600; 310; 30; 20 MG/100ML; MG/100ML; MG/100ML; MG/100ML
INJECTION, SOLUTION INTRAVENOUS CONTINUOUS
Status: DISCONTINUED | OUTPATIENT
Start: 2021-05-25 | End: 2021-05-25 | Stop reason: HOSPADM

## 2021-05-25 RX ORDER — ROCURONIUM BROMIDE 10 MG/ML
INJECTION, SOLUTION INTRAVENOUS PRN
Status: DISCONTINUED | OUTPATIENT
Start: 2021-05-25 | End: 2021-05-25 | Stop reason: SDUPTHER

## 2021-05-25 RX ORDER — SODIUM CHLORIDE 9 MG/ML
25 INJECTION, SOLUTION INTRAVENOUS PRN
Status: DISCONTINUED | OUTPATIENT
Start: 2021-05-25 | End: 2021-05-25 | Stop reason: HOSPADM

## 2021-05-25 RX ORDER — SODIUM CHLORIDE 0.9 % (FLUSH) 0.9 %
5-40 SYRINGE (ML) INJECTION EVERY 12 HOURS SCHEDULED
Status: DISCONTINUED | OUTPATIENT
Start: 2021-05-25 | End: 2021-05-25 | Stop reason: HOSPADM

## 2021-05-25 RX ORDER — MEPERIDINE HYDROCHLORIDE 25 MG/ML
12.5 INJECTION INTRAMUSCULAR; INTRAVENOUS; SUBCUTANEOUS EVERY 5 MIN PRN
Status: DISCONTINUED | OUTPATIENT
Start: 2021-05-25 | End: 2021-05-25 | Stop reason: HOSPADM

## 2021-05-25 RX ORDER — SODIUM CHLORIDE 0.9 % (FLUSH) 0.9 %
5-40 SYRINGE (ML) INJECTION PRN
Status: DISCONTINUED | OUTPATIENT
Start: 2021-05-25 | End: 2021-05-25 | Stop reason: HOSPADM

## 2021-05-25 RX ORDER — SUCCINYLCHOLINE/SOD CL,ISO/PF 200MG/10ML
SYRINGE (ML) INTRAVENOUS PRN
Status: DISCONTINUED | OUTPATIENT
Start: 2021-05-25 | End: 2021-05-25 | Stop reason: SDUPTHER

## 2021-05-25 RX ORDER — FENTANYL CITRATE 50 UG/ML
50 INJECTION, SOLUTION INTRAMUSCULAR; INTRAVENOUS EVERY 5 MIN PRN
Status: DISCONTINUED | OUTPATIENT
Start: 2021-05-25 | End: 2021-05-25 | Stop reason: HOSPADM

## 2021-05-25 RX ORDER — PROPOFOL 10 MG/ML
INJECTION, EMULSION INTRAVENOUS PRN
Status: DISCONTINUED | OUTPATIENT
Start: 2021-05-25 | End: 2021-05-25 | Stop reason: SDUPTHER

## 2021-05-25 RX ORDER — MIDAZOLAM HYDROCHLORIDE 1 MG/ML
INJECTION INTRAMUSCULAR; INTRAVENOUS PRN
Status: DISCONTINUED | OUTPATIENT
Start: 2021-05-25 | End: 2021-05-25 | Stop reason: SDUPTHER

## 2021-05-25 RX ORDER — DIPHENHYDRAMINE HYDROCHLORIDE 50 MG/ML
12.5 INJECTION INTRAMUSCULAR; INTRAVENOUS
Status: DISCONTINUED | OUTPATIENT
Start: 2021-05-25 | End: 2021-05-25 | Stop reason: HOSPADM

## 2021-05-25 RX ADMIN — FENTANYL CITRATE 50 MCG: 50 INJECTION, SOLUTION INTRAMUSCULAR; INTRAVENOUS at 07:22

## 2021-05-25 RX ADMIN — SUGAMMADEX 250 MG: 100 INJECTION, SOLUTION INTRAVENOUS at 07:44

## 2021-05-25 RX ADMIN — FENTANYL CITRATE 50 MCG: 50 INJECTION, SOLUTION INTRAMUSCULAR; INTRAVENOUS at 07:12

## 2021-05-25 RX ADMIN — ROCURONIUM BROMIDE 40 MG: 10 INJECTION, SOLUTION INTRAVENOUS at 07:20

## 2021-05-25 RX ADMIN — Medication 200 MG: at 07:12

## 2021-05-25 RX ADMIN — CEFAZOLIN 3000 MG: 10 INJECTION, POWDER, FOR SOLUTION INTRAVENOUS at 07:20

## 2021-05-25 RX ADMIN — ONDANSETRON 4 MG: 2 INJECTION INTRAMUSCULAR; INTRAVENOUS at 07:12

## 2021-05-25 RX ADMIN — PROPOFOL 180 MG: 10 INJECTION, EMULSION INTRAVENOUS at 07:12

## 2021-05-25 RX ADMIN — MIDAZOLAM 2 MG: 1 INJECTION INTRAMUSCULAR; INTRAVENOUS at 07:05

## 2021-05-25 RX ADMIN — SODIUM CHLORIDE, POTASSIUM CHLORIDE, SODIUM LACTATE AND CALCIUM CHLORIDE: 600; 310; 30; 20 INJECTION, SOLUTION INTRAVENOUS at 07:05

## 2021-05-25 RX ADMIN — LIDOCAINE HYDROCHLORIDE 60 MG: 20 INJECTION, SOLUTION EPIDURAL; INFILTRATION; INTRACAUDAL; PERINEURAL at 07:12

## 2021-05-25 ASSESSMENT — PULMONARY FUNCTION TESTS
PIF_VALUE: 15
PIF_VALUE: 1
PIF_VALUE: 1
PIF_VALUE: 0
PIF_VALUE: 21
PIF_VALUE: 0
PIF_VALUE: 25
PIF_VALUE: 23
PIF_VALUE: 0
PIF_VALUE: 8
PIF_VALUE: 22
PIF_VALUE: 26
PIF_VALUE: 24
PIF_VALUE: 19
PIF_VALUE: 24
PIF_VALUE: 17
PIF_VALUE: 20
PIF_VALUE: 21
PIF_VALUE: 1
PIF_VALUE: 0
PIF_VALUE: 25
PIF_VALUE: 22
PIF_VALUE: 1
PIF_VALUE: 24

## 2021-05-25 ASSESSMENT — PAIN SCALES - GENERAL
PAINLEVEL_OUTOF10: 0
PAINLEVEL_OUTOF10: 0

## 2021-05-25 ASSESSMENT — PAIN DESCRIPTION - LOCATION: LOCATION: ABDOMEN

## 2021-05-25 ASSESSMENT — LIFESTYLE VARIABLES: SMOKING_STATUS: 0

## 2021-05-25 ASSESSMENT — PAIN - FUNCTIONAL ASSESSMENT: PAIN_FUNCTIONAL_ASSESSMENT: 0-10

## 2021-05-25 ASSESSMENT — PAIN DESCRIPTION - PAIN TYPE: TYPE: SURGICAL PAIN

## 2021-05-25 NOTE — PROGRESS NOTES
0845 admitted to stage 2 pacu   son is here with pt   0900 up to bathroom with her walker and assistance and voided    returned to room up in recliner chair and taking po well  0915 discharge instructions given to pt  And her son and they verbalized understanding of instructions   0930 discharged into the care of her son

## 2021-05-25 NOTE — H&P
Attending History and Physical        CHIEF COMPLAINT:  Postmenopausal bleeding      HISTORY OF PRESENT ILLNESS:    Cooper Galan is an 70 y.o. female who presents with post menopausal bleeding. This has been on going for few months. She did have office endometrial biopsy, has been using oral progesterone with minimal relief. Pt admits to normal bowel and bladder habits. Denies any fever chills or vomiting. Past Medical History:   Past Medical History:   Diagnosis Date    Diabetes mellitus ([facility] [address]     Diverticula of colon     Hypertension     NO MEDS    Moderate mitral regurgitation 2014    2D echo    Obesity, Class III, BMI 40-49.9 (morbid obesity) ([address]     Sleep apnea     uses cpap    Vaginal bleeding 02/2021        Past Surgical History:   Past Surgical History:   Procedure Laterality Date    ABDOMEN SURGERY  2015    Due to bowel obstruction- Dr Ede Barnes COLONOSCOPY N/A 10/23/2020    COLONOSCOPY DIAGNOSTIC performed by Eboni Otero MD at [address]  2015    DILATION AND CURETTAGE OF UTERUS      EYE SURGERY Right     cataract    JOINT REPLACEMENT Bilateral 2000, 2018    knees    TONSILLECTOMY      UPPER GASTROINTESTINAL ENDOSCOPY N/A 10/26/2020    EGD ESOPHAGOGASTRODUODENOSCOPY performed [facility] N/A 10/28/2020    EGD ESOPHAGOGASTRODUODENOSCOPY POSSIBLE BANDING performed by Bucky Elise MD at [facility] ENDOSCOPY        Allergies: Ciprofloxacin, Sulfa antibiotics, and Sulfasalazine     Medications:   No current facility-administered medications for this encounter.      Current Outpatient Medications   Medication Sig Dispense Refill    progesterone (PROMETRIUM) 100 MG capsule Take 1 capsule by mouth nightly 30 capsule 1    glimepiride (AMARYL) 1 MG tablet Take 1 mg by mouth 2 times daily       ferrous sulfate (IRON 325) 325 (65 Fe) MG tablet Take 1 tablet by mouth 2 times daily (with meals) 30 tablet 3    pantoprazole (PROTONIX) 40 MG tablet Take 1 tablet by mouth every morning (before breakfast) 30 tablet 3    sucralfate (CARAFATE) 1 GM tablet Take 1 tablet by mouth 4 times daily (before meals and nightly) 120 tablet 3    Cholecalciferol (VITAMIN D) 2000 UNITS CAPS capsule Take 1 capsule by mouth daily. Social History:   Social History     Tobacco Use    Smoking status: Never Smoker    Smokeless tobacco: Never Used   Substance Use Topics    Alcohol use: No        Family History:   Family History   Problem Relation Age of Onset    Heart Disease Mother     Heart Disease Father     Heart Disease Paternal Grandfather        Medications Prior to Admission:   No medications prior to admission. REVIEW OF SYSTEMS:    Constitutional: negative  Eyes: negative  Ears, nose, mouth, throat, and face: negative  Respiratory: negative  Cardiovascular: negative  Gastrointestinal: negative  Genitourinary:negative  Integument/breast: negative  Hematologic/lymphatic: negative  Musculoskeletal:negative  Neurological: negative  Allergic/Immunologic: negative     PHYSICAL EXAM   Ht 5' 6\" (1.676 m)   Wt (!) 303 lb (137.4 kg)   BMI 48.91 kg/m²     General appearance: alert, cooperative and in no acute distress. Head: NCAT  Eyes: PERRLA  Chest: no skin abnormalities, no masses  Lungs: clear to auscultation bilaterally, no wheezing/rales/rhonchi  Heart: regular rate and rhythm, S1, S2 normal, no murmur, click, rub or gallop  Abdomen: soft, non-tender; bowel sounds normal; no masses,  no organomegaly  Back: No CVAT, no skin abnormalities  Neurologic: Alert and oriented x 3. Grossly normal  Extremities: symmetrical without clubbing cyanosis or edema.     LABS:  CBC:   Lab Results   Component Value Date    WBC 3.9 10/28/2020    RBC 2.91 10/28/2020    HGB 8.0 10/28/2020    HCT 25.3 10/28/2020    MCV 86.9 10/28/2020    MCH 27.5 10/28/2020    MCHC 31.6 10/28/2020    RDW 16.2 10/28/2020     10/28/2020    MPV 9.3 10/28/2020       ASSESSMENT AND PLAN:       Assessment: Dejuan Hyde is an 70 y.o. female who presents with post menopausal bleeding    Plan: D&C, hysteroscopy possible removal of endometrial polyp  I explained the risks, benefits, alternatives, and potential complications associated with the above procedure to be performed and transfusions when applicable with the patient/responsible person prior to the procedure. All of the patient's questions were answered. The patient understands and agrees to surgery.      Electronically signed by Teodora Villalba MD on 5/25/2021 at 3:34 AM

## 2021-05-25 NOTE — OP NOTE
Department of Gynecology  OP REPORT               Pre-operative Diagnosis:  Postmenopausal bleeding    Post-operative Diagnosis:  Postmenopausal bleeding and endometrial polyps    Procedure: Hysteroscopy,  Dilatation and curettage removal of endometrial polyps/growth    Surgeon:  Ana Rosa Abad MD F[facility]OG     Assistant(s):  none    Anesthesia:  General Laryngeal Mask Airway Anesthesia    Findings:  Endometrial polyps and growth posterior aspect of the uterus extending mostly to the right side of the uterus    Total IV fluids:  1200  ml      Estimated blood loss:  less than 50ml      Specimens:  Endometrial curettings/polyps    Complications:  none    Condition: To recovery room in a stable condition    PROCEDURE:          Patient taken to operating room after informed consent obtained. Patient placed under general anesthesia without any complications. Patient prepped and draped in normal sterile fashion. Patient placed in dorsal lithotomy position. Weighted speculum placed in the posterior fornix. Anterior lip of cervix grabbed with single toothed tenaculum. Cervix was then gradually dilated with Rosendos dilators. After adequately dilating the cervix, the hysteroscope was then primed and introduced. Cavity was well visualized. Pictures obtain of all the 4 quadrants. Both the ostia were visualized. Visualization of the cavity revealed multiple polypoid growth posterior aspect of the uterus extending to the right side mostly anteriorly laterally and posteriorly . At this time the hysteroscope was then removed, curette was introduced. Curettings obtained from all 4 quadrants until a gritty texture was noted. At this time the hysteroscope was reintroduced, cavity was noted to be mostly free of all the growth . Pictures obtained. All the  instruments were then removed from the cervix and vagina. Needle sponge and instrument count was correct ×2.   Patient was then taken out of dorsal lithotomy position, awakened from anesthesia. Patient was then taken to recovery room in stable condition.     MD Chip Bright  5/25/2021

## 2021-05-25 NOTE — PROGRESS NOTES
Called Dr. Sienna Duncan to update on patient blood pressure. Dr. Sienna Duncan explained that patient was previously on a blood pressure medication but she took herself off of it. Dr. Sienna Duncan explained that the patient blood pressure is probably normal always this high. Dr. Sienna Duncan spoke with patient in pre-op about her high blood pressure. As long as patient is not symptomatic then she can be discharged.

## 2021-05-25 NOTE — ANESTHESIA PRE PROCEDURE
Department of Anesthesiology  Preprocedure Note       Name:  Jean-Claude Martin   Age:  70 y.o.  :  1945                                          MRN:  [id number]         Date:  2021      Surgeon: Magdi Man): Charbel Jean MD    Procedure: Procedure(s):  DILATATION AND CURETTAGE HYSTEROSCOPY WITH POLYPECTOMY    Medications prior to admission:   Prior to Admission medications    Medication Sig Start Date End Date Taking? Authorizing Provider   progesterone (PROMETRIUM) 100 MG capsule Take 1 capsule by mouth nightly 3/12/21  Yes ANTONIETTA Sinclair   glimepiride (AMARYL) 1 MG tablet Take 1 mg by mouth 2 times daily    Yes Historical Provider, MD   ferrous sulfate (IRON 325) 325 (65 Fe) MG tablet Take 1 tablet by mouth 2 times daily (with meals) 10/29/20  Yes Emily Balderas MD   pantoprazole (PROTONIX) 40 MG tablet Take 1 tablet by mouth every morning (before breakfast) 10/29/20  Yes Emily Balderas MD   sucralfate (CARAFATE) 1 GM tablet Take 1 tablet by mouth 4 times daily (before meals and nightly) 10/28/20  Yes Emily Balderas MD   Cholecalciferol (VITAMIN D) 2000 UNITS CAPS capsule Take 1 capsule by mouth daily. Yes Historical Provider, MD       Current medications:    Current Facility-Administered Medications   Medication Dose Route Frequency Provider Last Rate Last Admin    lactated ringers infusion   Intravenous Continuous Charbel Jean MD        sodium chloride flush 0.9 % injection 5-40 mL  5-40 mL Intravenous 2 times per day Charbel Jean MD        sodium chloride flush 0.9 % injection 5-40 mL  5-40 mL Intravenous PRN Charbel Jean MD        0.9 % sodium chloride infusion  25 mL Intravenous PRN Charbel Jean MD        ceFAZolin (ANCEF) 3,000 mg in dextrose 5 % 100 mL IVPB  3,000 mg Intravenous On Call to MD Lexus           Allergies:     Allergies   Allergen Reactions    Ciprofloxacin Other (See Comments)     DIZZY, SHAKING    Sulfa Antibiotics Rash    Sulfasalazine Rash       Problem List:    Patient Active Problem List   Diagnosis Code    Gastroenteritis K52.9    Abscess, liver K75.0    Diabetes mellitus ([facility] [address] E11.9    Hypertension I10    Abdominal pain R10.9    Anemia, chronic disease D63.8    ROBERTSON (dyspnea on exertion) R06.00    Frequent PVCs I49.3    GI hemorrhage K92.2    GI bleed K92.2    Obesity, Class III, BMI 40-49.9 (morbid obesity) ([facility] [address] E66.01    Moderate mitral regurgitation I34.0       Past Medical History:        Diagnosis Date    Diabetes mellitus ([facility] [address]     Diverticula of colon     Hypertension     NO MEDS    Moderate mitral regurgitation 2014    2D echo    Obesity, Class III, BMI 40-49.9 (morbid obesity) ([facility] [address]     Sleep apnea     uses cpap    Vaginal bleeding 02/2021       Past Surgical History:        Procedure Laterality Date    ABDOMEN SURGERY  2015    Due to bowel obstruction- Dr Ramona Caruso N/A 10/23/2020    [facility] [address] [facility] [address]  [facility]   [address] [facility]      EYE SURGERY Right     cataract    JOINT REPLACEMENT Bilateral 2000, 2018    knees    TONSILLECTOMY      UPPER GASTROINTESTINAL ENDOSCOPY N/A 10/26/2020    EGD ESOPHAGOGASTRODUODENOSCOPY performed by Nydia Rosales MD at [address] [facility] N/A 10/28/2020    EGD ESOPHAGOGASTRODUODENOSCOPY POSSIBLE BANDING performed by Winston Jefferson MD at [address] History:    Social History     Tobacco Use    Smoking status: Never Smoker    Smokeless tobacco: Never Used   Substance Use Topics    Alcohol use:  No                                Counseling given: Not Answered      Vital Signs (Current):   Vitals:    05/20/21 1016 05/25/21 0605 05/25/21 0606   BP:  (!) 200/89 (!) 199/89   Pulse:  81    Resp:  20    Temp:  97 °F (36.1 °C)    TempSrc:  Temporal    SpO2:  98%    Weight: (!) 303 lb (137.4 kg) (!) 303 lb (137.4 kg)    Height: 5' 6\" (1.676 m) 5' 6\" (1.676 m)                                               BP Readings from Last 3 Encounters:   05/25/21 (!) 199/89   04/27/21 130/68   04/21/21 (!) 144/83       NPO Status: Time of last liquid consumption: 1900                        Time of last solid consumption: 1900                        Date of last liquid consumption: 05/24/21                        Date of last solid food consumption: 05/24/21    BMI:   Wt Readings from Last 3 Encounters:   05/25/21 (!) 303 lb (137.4 kg)   04/27/21 (!) 303 lb (137.4 kg)   04/21/21 (!) 312 lb (141.5 kg)     Body mass index is 48.91 kg/m². CBC:   Lab Results   Component Value Date    WBC 4.6 05/25/2021    RBC 4.48 05/25/2021    HGB 13.2 05/25/2021    HCT 39.2 05/25/2021    MCV 87.5 05/25/2021    RDW 15.5 05/25/2021     05/25/2021       CMP:   Lab Results   Component Value Date     10/28/2020    K 3.6 10/28/2020     10/28/2020    CO2 24 10/28/2020    BUN 7 10/28/2020    CREATININE 0.7 10/28/2020    GFRAA >60 10/28/2020    LABGLOM >60 10/28/2020    GLUCOSE 142 10/28/2020    GLUCOSE 101 04/10/2012    PROT 6.3 10/28/2020    CALCIUM 8.7 10/28/2020    BILITOT 0.4 10/28/2020    ALKPHOS 89 10/28/2020    AST 25 10/28/2020    ALT 14 10/28/2020       POC Tests: No results for input(s): POCGLU, POCNA, POCK, POCCL, POCBUN, POCHEMO, POCHCT in the last 72 hours.     Coags:   Lab Results   Component Value Date    PROTIME 13.9 10/25/2020    INR 1.2 10/25/2020    APTT 29.6 10/22/2020       HCG (If Applicable):   Lab Results   Component Value Date    PREGTESTUR neg 03/03/2021        ABGs: No results found for: PHART, PO2ART, FGZ2ZCF, PLL1SJT, BEART, C3GHNCYB     Type & Screen (If Applicable):  No results found for: LABABO, LABRH    Drug/Infectious Status (If Applicable):  No results found for: HIV, HEPCAB    COVID-19 Screening (If Applicable): No results found for: COVID19      Anesthesia Evaluation  Patient summary reviewed no history of anesthetic complications:   Airway: Mallampati: III  TM distance: >3 FB   Neck ROM: full  Mouth opening: > = 3 FB Dental:    (+) upper dentures      Pulmonary: breath sounds clear to auscultation  (+) shortness of breath:  sleep apnea: on CPAP,      (-) not a current smoker                           Cardiovascular:    (+) hypertension (previously on meds; states she is not on meds currently;  preop /89):, valvular problems/murmurs (mild-moderate MR): MR, dysrhythmias (frequent PVCs):, ROBERTSON:,         Rhythm: regular  Rate: normal                 ROS comment: EKG 2017: NSR     Neuro/Psych:   Negative Neuro/Psych ROS              GI/Hepatic/Renal:   (+) GERD:, liver disease: esophageal varices, morbid obesity         ROS comment: H/o prior GI bleed    Colostomy (6 years ago); Not reversed. Endo/Other:    (+) DiabetesType II DM, , blood dyscrasia: anemia:., .                  ROS comment: Postmenopausal bleeding Abdominal:   (+) obese,         Vascular: negative vascular ROS. Anesthesia Plan      general     ASA 3       Induction: intravenous. Anesthetic plan and risks discussed with patient. Plan discussed with CRNA.           Jeffrey Josue MD           5/25/2021        6:50 AM

## 2021-10-21 ENCOUNTER — HOSPITAL ENCOUNTER (OUTPATIENT)
Dept: INFUSION THERAPY | Age: 76
Setting detail: INFUSION SERIES
Discharge: HOME | End: 2021-10-21
Payer: MEDICARE

## 2021-10-21 VITALS
DIASTOLIC BLOOD PRESSURE: 73 MMHG | SYSTOLIC BLOOD PRESSURE: 171 MMHG | RESPIRATION RATE: 18 BRPM | OXYGEN SATURATION: 95 % | TEMPERATURE: 98.4 F | HEART RATE: 90 BPM

## 2021-10-21 DIAGNOSIS — U07.1 COVID: Primary | ICD-10-CM

## 2021-10-21 DIAGNOSIS — U07.1 COVID: ICD-10-CM

## 2021-10-21 PROCEDURE — 6360000002 HC RX W HCPCS

## 2021-10-21 PROCEDURE — M0243 CASIRIVI AND IMDEVI INFUSION: HCPCS

## 2021-10-21 PROCEDURE — 2580000003 HC RX 258: Performed by: INTERNAL MEDICINE

## 2021-10-21 RX ORDER — EPINEPHRINE 1 MG/ML
0.3 INJECTION, SOLUTION, CONCENTRATE INTRAVENOUS PRN
Status: CANCELLED | OUTPATIENT
Start: 2021-10-21

## 2021-10-21 RX ORDER — SODIUM CHLORIDE 9 MG/ML
INJECTION, SOLUTION INTRAVENOUS CONTINUOUS
Status: CANCELLED | OUTPATIENT
Start: 2021-10-21

## 2021-10-21 RX ORDER — SODIUM CHLORIDE 0.9 % (FLUSH) 0.9 %
5-40 SYRINGE (ML) INJECTION PRN
Status: CANCELLED | OUTPATIENT
Start: 2021-10-21

## 2021-10-21 RX ORDER — SODIUM CHLORIDE 9 MG/ML
INJECTION, SOLUTION INTRAVENOUS CONTINUOUS
Status: DISCONTINUED | OUTPATIENT
Start: 2021-10-21 | End: 2021-10-22 | Stop reason: HOSPADM

## 2021-10-21 RX ORDER — SODIUM CHLORIDE 0.9 % (FLUSH) 0.9 %
5-40 SYRINGE (ML) INJECTION PRN
Status: DISCONTINUED | OUTPATIENT
Start: 2021-10-21 | End: 2021-10-22 | Stop reason: HOSPADM

## 2021-10-21 RX ORDER — EPINEPHRINE 1 MG/ML
0.3 INJECTION, SOLUTION, CONCENTRATE INTRAVENOUS PRN
OUTPATIENT
Start: 2021-10-21

## 2021-10-21 RX ORDER — METHYLPREDNISOLONE SODIUM SUCCINATE 125 MG/2ML
125 INJECTION, POWDER, LYOPHILIZED, FOR SOLUTION INTRAMUSCULAR; INTRAVENOUS ONCE
OUTPATIENT
Start: 2021-10-21 | End: 2021-10-21

## 2021-10-21 RX ORDER — DIPHENHYDRAMINE HYDROCHLORIDE 50 MG/ML
50 INJECTION INTRAMUSCULAR; INTRAVENOUS ONCE
OUTPATIENT
Start: 2021-10-21 | End: 2021-10-21

## 2021-10-21 RX ORDER — METHYLPREDNISOLONE SODIUM SUCCINATE 125 MG/2ML
125 INJECTION, POWDER, LYOPHILIZED, FOR SOLUTION INTRAMUSCULAR; INTRAVENOUS ONCE
Status: CANCELLED | OUTPATIENT
Start: 2021-10-21 | End: 2021-10-21

## 2021-10-21 RX ORDER — SODIUM CHLORIDE 9 MG/ML
INJECTION, SOLUTION INTRAVENOUS CONTINUOUS
OUTPATIENT
Start: 2021-10-21

## 2021-10-21 RX ORDER — DIPHENHYDRAMINE HYDROCHLORIDE 50 MG/ML
50 INJECTION INTRAMUSCULAR; INTRAVENOUS ONCE
Status: CANCELLED | OUTPATIENT
Start: 2021-10-21 | End: 2021-10-21

## 2021-10-21 RX ADMIN — SODIUM CHLORIDE: 900 INJECTION, SOLUTION INTRAVENOUS at 13:00

## 2021-10-21 RX ADMIN — SODIUM CHLORIDE, PRESERVATIVE FREE 10 ML: 5 INJECTION INTRAVENOUS at 12:55

## 2021-10-21 RX ADMIN — SODIUM CHLORIDE, PRESERVATIVE FREE 10 ML: 5 INJECTION INTRAVENOUS at 13:35

## 2021-10-21 NOTE — PROGRESS NOTES
Patient arrived to receive Casirivimab/Imdevimab infusion. Patient was given education handouts on the medication and information sheet on 10 things to do when you have COVID-19. Patient was explained the risk and benefits of receiving the infusion. Verbal consent was obtained.

## 2021-10-21 NOTE — PROGRESS NOTES
Patient tolerated Casirivimab/Imdevimab infusion well. Discharged from infusion services. Instructed on signs/symptoms to report to physician patient verbalizes understanding. Patient instructed and given copy of AVS, verbalizes understanding.

## 2022-10-03 ENCOUNTER — HOSPITAL ENCOUNTER (OUTPATIENT)
Dept: MAMMOGRAPHY | Age: 77
Discharge: HOME | End: 2022-10-05
Payer: MEDICARE

## 2022-10-03 DIAGNOSIS — Z12.31 ENCOUNTER FOR SCREENING MAMMOGRAM FOR MALIGNANT NEOPLASM OF BREAST: ICD-10-CM

## 2022-10-03 PROCEDURE — 77063 BREAST TOMOSYNTHESIS BI: CPT

## 2022-10-24 ENCOUNTER — HOSPITAL ENCOUNTER (OUTPATIENT)
Dept: GENERAL RADIOLOGY | Age: 77
Discharge: HOME | End: 2022-10-26
Payer: MEDICARE

## 2022-10-24 ENCOUNTER — HOSPITAL ENCOUNTER (OUTPATIENT)
Age: 77
Discharge: HOME | End: 2022-10-26
Payer: MEDICARE

## 2022-10-24 DIAGNOSIS — M25.572 LEFT ANKLE PAIN, UNSPECIFIED CHRONICITY: ICD-10-CM

## 2022-10-24 PROCEDURE — 73610 X-RAY EXAM OF ANKLE: CPT

## 2022-11-11 ENCOUNTER — OFFICE VISIT (OUTPATIENT)
Dept: BARIATRICS/WEIGHT MGMT | Age: 77
End: 2022-11-11
Payer: MEDICARE

## 2022-11-11 VITALS
WEIGHT: 293 LBS | DIASTOLIC BLOOD PRESSURE: 77 MMHG | HEIGHT: 63 IN | SYSTOLIC BLOOD PRESSURE: 154 MMHG | BODY MASS INDEX: 51.91 KG/M2 | TEMPERATURE: 97.1 F | HEART RATE: 82 BPM

## 2022-11-11 DIAGNOSIS — E66.01 OBESITY, CLASS III, BMI 40-49.9 (MORBID OBESITY) (HCC): Primary | ICD-10-CM

## 2022-11-11 DIAGNOSIS — I89.0 LYMPHEDEMA: ICD-10-CM

## 2022-11-11 PROCEDURE — 99202 OFFICE O/P NEW SF 15 MIN: CPT | Performed by: INTERNAL MEDICINE

## 2022-11-11 PROCEDURE — 3074F SYST BP LT 130 MM HG: CPT | Performed by: INTERNAL MEDICINE

## 2022-11-11 PROCEDURE — 3078F DIAST BP <80 MM HG: CPT | Performed by: INTERNAL MEDICINE

## 2022-11-11 PROCEDURE — 99205 OFFICE O/P NEW HI 60 MIN: CPT | Performed by: INTERNAL MEDICINE

## 2022-11-11 PROCEDURE — 1123F ACP DISCUSS/DSCN MKR DOCD: CPT | Performed by: INTERNAL MEDICINE

## 2022-11-11 RX ORDER — TOPIRAMATE 25 MG/1
25 TABLET ORAL 2 TIMES DAILY
Qty: 120 TABLET | Refills: 1 | Status: SHIPPED | OUTPATIENT
Start: 2022-11-11

## 2022-11-11 NOTE — PROGRESS NOTES
CC -   Lymphedema, Obesity    BACKGROUND -   First visit: 11/11/22    Obesity   Began in early 40's  Initial BMI 55.0, Wt 310.2 lbs, Ht 5' 3\"  HS Grad wt 128 lbs  Lowest   wt 128 lbs  Highest  wt 310 lbs  Pattern of wt gain: grad  Wt change past yr: +8 lbs  Most wt lost: 30 lbs (Calorie count)  Other diets attempted: [employer]    Desire to lose weight: 10/10  Problem posed by appetite: 10/10    Initial Diet:    Number of meals per day - 2    Number of snacks per day - grazing    Meal volume - 12\" plate, no seconds    Fast food/convenience store - 0x/week    Restaurants (not fast food) - 1x/week   Sweets - 7d/week (cookies (180 lance), cook and serve pudding (360 lance))   Chips - 0d/week   Crackers/pretzels - 2d/week (1/4th sleeve Ritz or one handful of pretzels)   Nuts - 0d/week   Peanut Butter - 0d/week   Popcorn - 0-1d/week (100 lance MW)   Dried fruit - 0d/week   Whole fruit - 5d/week (one-two servings)   Breakfast cereal - 3-4d/week (1 cinnamon roll flavored oat meal packet 100 lance)   Granola/Protein/Energy bar - 0d/week   Sugar sweetened beverages - 0   Protein - No supplements   Fiber - No supplements     Exercise:    Gym membership - none    Walking - none    Running - none    Resistance - none    Aerobic class - none    ______________________    [facility] -  Past Medical History:   Diagnosis Date    Diabetes mellitus ([address]     Diverticula of colon     Hypertension     NO MEDS    Moderate mitral regurgitation 2014    2D echo    Obesity, Class III, BMI 40-49.9 (morbid obesity) (H[facility])     Sleep apnea     uses cpap    Vaginal bleeding 02/2021     Current Outpatient Medications   Medication Sig Dispense Refill    losartan (COZAAR) 50 MG tablet TAKE ONE TABLET BY MOUTH EVERY DAY      glimepiride (AMARYL) 1 MG tablet Take 1 mg by mouth 2 times daily       ferrous sulfate (IRON 325) 325 (65 Fe) MG tablet Take 1 tablet by mouth 2 times daily (with meals) 30 tablet 3    pantoprazole (PROTONIX) 40 MG tablet Take 1 tablet by mouth every morning (before breakfast) 30 tablet 3    sucralfate (CARAFATE) 1 GM tablet Take 1 tablet by mouth 4 times daily (before meals and nightly) 120 tablet 3    Cholecalciferol (VITAMIN D) 2000 UNITS CAPS capsule Take 1 capsule by mouth in the morning. .       No current facility-administered medications for this visit. ROS -  Card - no CP  GI - no N/V/D/C    PE -  Gen : BP (!) 160/55 (Site: Left Lower Arm, Position: Sitting, Cuff Size: Large Adult)   Pulse 85   Temp 97.1 °F (36.2 °C) (Temporal)   Ht 5' 3\" (1.6 m)   Wt (!) 310 lb 3.2 oz (140.7 kg)   BMI 54.95 kg/m²   Repeat /77   WN, WD, NAD  Heart:  RRR w/o MGR, no Carotid bruits, 4++++ LE pitting edema b/l  Lung: Nml resp effort, CTA b/l  Psych: Normal mood   Full affect  Neuro: Moves all ext well  ______________________    HISTORY & ASSESSMENT/PLAN -     Problem 1 - Lymphedema of LE's  HPI -  Present since her mid-60's   No heart failure   Receives treatment from [address] [place] [address] (they only treat lymphedema)   Excess wt is contributing to the severity  Assessment - Uncontrolled; reduction of excess wt may decrease her edema  Plan -   Wt loss per the plan below    Problem 2 - Obesity   HPI -  See above Background for description  Weight  Date   310.2 lbs 11/11/22  DEN = 1600 lance/d  Wt effect of HR foods = Restaurant food 150 lance/wk + Sweets 1890 = 2040 lance/wk = 290 lance/d = 18% DEN = 29 lbs/yr  Does not want surgery or a VLCD  Opted for a counting-based regimen; simply eliminating sweets may be sufficient to produce weight loss in itself, though  She would like an appetite suppressant; phentermine not an option b/c of her elevated BP; for same reason, will avoid bupropion and venlafaxine; GLP1 agonists will like be cost-prohibitive  Will trial topiramate.    Assessment - Uncontrolled  Plan -   Rules:  Count every calorie every day  Limit sweets to one day per month  Limit chips/crackers/pretzels/popcorn to 100 lance/day  Limit restaurants (including fast food and food from a convenience store) to one time every two weeks while in town    Requirements:  Make sure protein intake is at least 65 grams per day (do not count protein every day; instead spot check your intake every 2-3 weeks and make sure what you think you are getting is close to accurate; consider using a protein shake if needed; these are in the pharmacy section of the stores, not the grocery section; Premier, Pure Protein and Fairlife are relatively inexpensive and taste good to most patients; other options are Nectar, Boost Max, Ensure Max, BeneProtein and GNC lean (which is lactose-free); Nectar fruit, Premier Protein Clear, IsoPure Protein Drink, and Protein 2 O are water-based options; Quest (or Cosco, which is cheaper and is ordered on [employer]) and the Kineta protein bars can also be used, but have less protein in them )  (Disclaimer: Dietary supplements rarely have their listed ingredients and the amount of each verified by a third party other. Sometimes they give verification for their claims to be GMO and gluten free and to be organic. However, even such verifications as these may still be untrustworthy.)  Make sure that fiber intake is at least 22 grams per day. Do this by either eating 12 tablespoons of the original, plain Fiber One cereal every day or 4 tablespoons of wheat dextrin powder (Benefiber or a generic brand) every day. Work up to this amount slowly by starting with only one-eighth to one-fourth of the target amount and then adding another one-eighth to one-fourth every one or two weeks until reaching the target. Drink at least 64 oz of water each day  Take one multivitamin every day    Targets:  Limit calorie intake to 1200 calories/day  Avoid eating 2 hours within bedtime.      Tips:  Do not eat outside of the dining room or the kitchen  Do not eat while watching TV, videos, working on the computer or using a smart phone  Do not eat food out of a multi-serving bag or container. Establish 6 hours of food-free \"time-out\" periods (times you don't eat) each day. No period can be less than 1 hour long. The periods need to be the same every day for days that are the same (for example, workdays would have one set of food free periods and weekends would have another set of days). These six hours are in addition to the two hours before bedtime and the time spent sleeping. Tips:  Do not eat outside of the dining room or the kitchen    Medications:  Start Topiramate 25 mg. Take one tablet twice each day for one week. If the appetite suppression is insufficient, then increase to two tablets twice daily. Have the ordered blood test performed at the end of second week after starting it.       Return to see me in 4-6 weeks      Total time spent on encounter: 65 min    Dereck Bright MD  Endocrinology/Obesity  11/11/22

## 2022-12-10 PROBLEM — I89.0 LYMPHEDEMA: Status: ACTIVE | Noted: 2022-12-10

## 2022-12-12 ENCOUNTER — TELEPHONE (OUTPATIENT)
Dept: BARIATRICS/WEIGHT MGMT | Age: 77
End: 2022-12-12

## 2023-07-31 DIAGNOSIS — E66.01 OBESITY, CLASS III, BMI 40-49.9 (MORBID OBESITY) (HCC): ICD-10-CM

## 2023-09-07 RX ORDER — TOPIRAMATE 25 MG/1
TABLET ORAL
Qty: 120 TABLET | Refills: 0 | OUTPATIENT
Start: 2023-09-07

## 2023-10-18 ENCOUNTER — HOSPITAL ENCOUNTER (OUTPATIENT)
Dept: MAMMOGRAPHY | Age: 78
Discharge: HOME | End: 2023-10-20

## 2023-10-18 DIAGNOSIS — Z12.31 BREAST CANCER SCREENING BY MAMMOGRAM: ICD-10-CM

## 2023-10-26 ENCOUNTER — HOSPITAL ENCOUNTER (OUTPATIENT)
Dept: MAMMOGRAPHY | Age: 78
Discharge: HOME | End: 2023-10-28
Attending: FAMILY MEDICINE
Payer: MEDICARE

## 2023-10-26 PROCEDURE — 77063 BREAST TOMOSYNTHESIS BI: CPT

## 2023-11-27 ENCOUNTER — HOSPITAL ENCOUNTER (EMERGENCY)
Age: 78
Discharge: LEFT BEFORE DISCHARGE | End: 2023-11-27
Attending: EMERGENCY MEDICINE
Payer: MEDICARE

## 2023-11-27 ENCOUNTER — APPOINTMENT (OUTPATIENT)
Dept: ULTRASOUND IMAGING | Age: 78
End: 2023-11-27
Payer: MEDICARE

## 2023-11-27 VITALS
HEIGHT: 66 IN | TEMPERATURE: 97 F | SYSTOLIC BLOOD PRESSURE: 160 MMHG | DIASTOLIC BLOOD PRESSURE: 71 MMHG | RESPIRATION RATE: 18 BRPM | WEIGHT: 280 LBS | OXYGEN SATURATION: 95 % | BODY MASS INDEX: 45 KG/M2 | HEART RATE: 80 BPM

## 2023-11-27 PROCEDURE — 99284 EMERGENCY DEPT VISIT MOD MDM: CPT

## 2023-11-27 PROCEDURE — 93971 EXTREMITY STUDY: CPT

## 2023-11-27 NOTE — ED TRIAGE NOTES
Department of Emergency Medicine  FIRST PROVIDER TRIAGE NOTE             Independent MLP           11/27/23  4:59 PM EST    Date of Encounter: 11/27/23   MRN: [id number]      HPI: Costa Mullins is a 70 y.o. female who presents to the ED for Leg Swelling (Edema and redness to L leg, sent by PCP)     Patient is a 70year old female presenting with LLE erythema and edema. Hx of lymphedema. ROS: Negative for cp, sob, abd pain, or back pain. PE: Gen Appearance/Constitutional: alert  HEENT: NC/NT. PERRLA,  Airway patent. Neck: supple     Initial Plan of Care: All treatment areas with department are currently occupied. Plan to order/Initiate the following while awaiting opening in ED: imaging studies.   Initiate Treatment-Testing, Proceed toTreatment Area When Bed Available for ED Attending/MLP to Continue Care    Electronically signed by Yolanda Mejia PA-C   DD: 11/27/23

## 2024-09-17 ENCOUNTER — HOSPITAL ENCOUNTER (OUTPATIENT)
Dept: GENERAL RADIOLOGY | Age: 79
Discharge: HOME | End: 2024-09-19
Payer: MEDICARE

## 2024-09-17 ENCOUNTER — HOSPITAL ENCOUNTER (OUTPATIENT)
Age: 79
Discharge: HOME | End: 2024-09-19
Payer: MEDICARE

## 2024-09-17 DIAGNOSIS — M25.561 PAIN, JOINT, KNEE, RIGHT: ICD-10-CM

## 2024-09-17 PROCEDURE — 73564 X-RAY EXAM KNEE 4 OR MORE: CPT

## 2024-10-08 ENCOUNTER — HOSPITAL ENCOUNTER (OUTPATIENT)
Dept: ULTRASOUND IMAGING | Age: 79
Discharge: HOME | End: 2024-10-10
Payer: MEDICARE

## 2024-10-08 DIAGNOSIS — R94.4 NONSPECIFIC ABNORMAL RESULTS OF KIDNEY FUNCTION STUDY: ICD-10-CM

## 2024-10-08 PROCEDURE — 76770 US EXAM ABDO BACK WALL COMP: CPT

## 2024-10-29 ENCOUNTER — HOSPITAL ENCOUNTER (OUTPATIENT)
Dept: MAMMOGRAPHY | Age: 79
Discharge: HOME | End: 2024-10-31
Payer: MEDICARE

## 2024-10-29 VITALS — WEIGHT: 270 LBS | BODY MASS INDEX: 43.39 KG/M2 | HEIGHT: 66 IN

## 2024-10-29 DIAGNOSIS — Z12.31 ENCOUNTER FOR SCREENING MAMMOGRAM FOR MALIGNANT NEOPLASM OF BREAST: ICD-10-CM

## 2024-10-29 PROCEDURE — 77063 BREAST TOMOSYNTHESIS BI: CPT

## 2024-12-11 NOTE — ED PROVIDER NOTES
53-year-old female presents to the emergency department with concern for bleeding from her stoma. The patient had a colectomy with ostomy placement 10 years ago with Dr. Nusrat Gomes. She states since he left the area he now follows with Dr. Armin Loza. Patient states she is to have an appointment in 2 hours with the physician. She states over the last 5 days she has had intermittent bleeding that lasted approximately 4 days. She noted a large clot 4 days ago. She then noted that it stopped yesterday. And then started up overnight last night. She states that it now has stopped. She states no abdominal pain nausea vomiting diarrhea or other complaints. She is on no anticoagulation. The history is provided by the patient. Wound Check    She was treated in the ED 3 to 5 days ago. Prior ED Treatment: Ostomy bleeding. There has been no treatment since the wound repair. Her temperature was unmeasured prior to arrival. There has been bloody discharge from the wound. There is no redness present. There is no swelling present. There is no pain present. Review of Systems   Constitutional: Negative for chills and fever. HENT: Negative for ear pain, sinus pressure and sore throat. Eyes: Negative for pain, discharge and redness. Respiratory: Negative for cough, shortness of breath and wheezing. Cardiovascular: Negative for chest pain. Gastrointestinal: Negative for abdominal distention, diarrhea, nausea and vomiting. Ostomy bleeding   Genitourinary: Negative for dysuria and frequency. Musculoskeletal: Negative for arthralgias and back pain. Skin: Negative for rash and wound. Neurological: Negative for weakness and headaches. Hematological: Negative for adenopathy. All other systems reviewed and are negative. Physical Exam  Constitutional:       Appearance: Normal appearance. She is obese. HENT:      Head: Normocephalic and atraumatic.    Eyes:      Extraocular Movements: well developed, well nourished , in no acute distress , ambulating without difficulty , normal communication ability  , well developed, well nourished , in no acute distress , ambulating without difficulty , normal communication ability  Extraocular movements intact. Pupils: Pupils are equal, round, and reactive to light. Cardiovascular:      Rate and Rhythm: Normal rate and regular rhythm. Pulses: Normal pulses. Heart sounds: Normal heart sounds. Pulmonary:      Effort: Pulmonary effort is normal.      Breath sounds: Normal breath sounds. Abdominal:      General: Abdomen is flat. The ostomy site is clean. Bowel sounds are normal.      Palpations: Abdomen is soft. Tenderness: There is no abdominal tenderness. Skin:     General: Skin is warm. Capillary Refill: Capillary refill takes less than 2 seconds. Neurological:      Mental Status: She is alert and oriented to person, place, and time. Procedures     MDM  Number of Diagnoses or Management Options  Anemia, unspecified type:   Hemorrhage from gastrostomy stoma [facility]):   Diagnosis management comments: Patient seen and examined. Basic lab work was initiated hemoglobin is found to be under 8 and given patient having active bleeding from stoma it was felt patient warranted admission for evaluation of anemia. Case discussed with Dr. Monty Shirley and Dr. Mandi Sagastume who will provide surgical consultation and admit respectively. Dr. Mandi Sagastume did a record request a unit of blood be ordered. --------------------------------------------- PAST HISTORY ---------------------------------------------  Past Medical History:  has a past medical history of Bowel obstruction ([facility] [address] Diabetes mellitus ([facility] [address] Diverticula of colon, ROBERTSON (dyspnea on exertion), Frequent PVCs, Hypertension, Knee gives out, and Pre-operative cardiovascular examination. Past Surgical History:  has a past surgical history that includes Appendectomy; Tonsillectomy; joint replacement; Dilation and curettage of uterus; colostomy; and Abdomen surgery. Social History:  reports that she has never smoked.  She has never used smokeless tobacco. She reports that she does not drink alcohol or use drugs. Family History: family history includes Heart Disease in her father, mother, and paternal grandfather. The patients home medications have been reviewed.     Allergies: Ciprofloxacin; Sulfa antibiotics; and Sulfasalazine    -------------------------------------------------- RESULTS -------------------------------------------------    LABS:  Results for orders placed or performed during the hospital encounter of 10/22/20   CBC auto differential   Result Value Ref Range    WBC 3.8 (L) 4.5 - 11.5 E9/L    RBC 2.77 (L) 3.50 - 5.50 E12/L    Hemoglobin 7.6 (L) 11.5 - 15.5 g/dL    Hematocrit 24.2 (L) 34.0 - 48.0 %    MCV 87.4 80.0 - 99.9 fL    MCH 27.4 26.0 - 35.0 pg    MCHC 31.4 (L) 32.0 - 34.5 %    RDW 15.9 (H) 11.5 - 15.0 fL    Platelets 751 141 - 431 E9/L    MPV 9.1 7.0 - 12.0 fL    Neutrophils % 67.7 43.0 - 80.0 %    Immature Granulocytes % 0.3 0.0 - 5.0 %    Lymphocytes % 20.5 20.0 - 42.0 %    Monocytes % 9.7 2.0 - 12.0 %    Eosinophils % 1.3 0.0 - 6.0 %    Basophils % 0.5 0.0 - 2.0 %    Neutrophils Absolute 2.58 1.80 - 7.30 E9/L    Immature Granulocytes # 0.01 E9/L    Lymphocytes Absolute 0.78 (L) 1.50 - 4.00 E9/L    Monocytes Absolute 0.37 0.10 - 0.95 E9/L    Eosinophils Absolute 0.05 0.05 - 0.50 E9/L    Basophils Absolute 0.02 0.00 - 0.20 E4/T   Basic Metabolic Panel   Result Value Ref Range    Sodium 136 132 - 146 mmol/L    Potassium 3.9 3.5 - 5.0 mmol/L    Chloride 105 98 - 107 mmol/L    CO2 25 22 - 29 mmol/L    Anion Gap 6 (L) 7 - 16 mmol/L    Glucose 192 (H) 74 - 99 mg/dL    BUN 10 8 - 23 mg/dL    CREATININE 0.7 0.5 - 1.0 mg/dL    GFR Non-African American >60 >=60 mL/min/1.73    GFR African American >60     Calcium 8.7 8.6 - 10.2 mg/dL   APTT   Result Value Ref Range    aPTT 29.6 24.5 - 35.1 sec   Protime-INR   Result Value Ref Range    Protime 13.3 (H) 9.3 - 12.4 sec    INR 1.2    TYPE AND SCREEN   Result Value Ref Range    ABO/Rh AB POS     Antibody Screen NEG    PREPARE RBC (CROSSMATCH), 1 Units Result Value Ref Range    Product Code Blood Bank [id number]     Description Blood Bank Red Blood Cells, Leuko-reduced     Unit Number [id number]     Dispense Status Blood Bank issued        RADIOLOGY:  No results found.    ------------------------- NURSING NOTES AND VITALS REVIEWED ---------------------------  Date / Time Roomed:  10/22/2020 10:59 AM  ED Bed Assignment:  [id number]    The nursing notes within the ED encounter and vital signs as below have been reviewed. Patient Vitals for the past 24 hrs:   BP Temp Temp src Pulse Resp SpO2 Height Weight   10/22/20 1456 (!) 180/77 97.9 °F (36.6 °C) Oral 73 18 98 % -- --   10/22/20 1430 (!) 181/76 98 °F (36.7 °C) Oral 77 18 98 % -- --   10/22/20 1417 (!) 171/75 98.2 °F (36.8 °C) Oral 78 18 99 % -- --   10/22/20 1257 (!) 153/66 98 °F (36.7 °C) Oral 75 18 99 % -- --   10/22/20 1217 (!) 155/72 98.2 °F (36.8 °C) Oral 77 18 98 % -- --   10/22/20 1100 (!) 141/65 97.7 °F (36.5 °C) Oral 80 16 98 % 5' 6\" (1.676 m) 290 lb (131.5 kg)       Oxygen Saturation Interpretation: Normal    ------------------------------------------ PROGRESS NOTES ------------------------------------------  Counseling:  I have spoken with the patient and discussed todays results, in addition to providing specific details for the plan of care and counseling regarding the diagnosis and prognosis. Their questions are answered at this time and they are agreeable with the plan of admission.    --------------------------------- ADDITIONAL PROVIDER NOTES ---------------------------------  This patient's ED course included: a personal history and physicial examination, re-evaluation prior to disposition, multiple bedside re-evaluations, IV medications, cardiac monitoring and continuous pulse oximetry    This patient has remained hemodynamically stable during their ED course. Diagnosis:  1. Hemorrhage from gastrostomy stoma ([facility] [address]    2.  Anemia, unspecified type        Disposition:  Patient's disposition: Admit to med/surg floor  Patient's condition is fair.          Sandra Lazo DO  10/22/20 7426

## 2025-08-09 ENCOUNTER — HOSPITAL ENCOUNTER (EMERGENCY)
Age: 80
Discharge: HOME | End: 2025-08-10
Attending: EMERGENCY MEDICINE
Payer: MEDICARE

## 2025-08-09 ENCOUNTER — APPOINTMENT (OUTPATIENT)
Dept: GENERAL RADIOLOGY | Age: 80
End: 2025-08-09
Payer: MEDICARE

## 2025-08-09 ENCOUNTER — APPOINTMENT (OUTPATIENT)
Dept: ULTRASOUND IMAGING | Age: 80
End: 2025-08-09
Payer: MEDICARE

## 2025-08-09 VITALS
WEIGHT: 270 LBS | RESPIRATION RATE: 16 BRPM | HEART RATE: 73 BPM | DIASTOLIC BLOOD PRESSURE: 71 MMHG | SYSTOLIC BLOOD PRESSURE: 167 MMHG | OXYGEN SATURATION: 96 % | BODY MASS INDEX: 43.58 KG/M2 | TEMPERATURE: 98.1 F

## 2025-08-09 DIAGNOSIS — I89.0 LYMPHEDEMA: ICD-10-CM

## 2025-08-09 DIAGNOSIS — L03.116 CELLULITIS OF LEFT LOWER EXTREMITY: ICD-10-CM

## 2025-08-09 DIAGNOSIS — M79.605 LEFT LEG PAIN: Primary | ICD-10-CM

## 2025-08-09 PROCEDURE — 93971 EXTREMITY STUDY: CPT

## 2025-08-09 PROCEDURE — 99284 EMERGENCY DEPT VISIT MOD MDM: CPT

## 2025-08-09 PROCEDURE — 73590 X-RAY EXAM OF LOWER LEG: CPT

## 2025-08-09 ASSESSMENT — PAIN DESCRIPTION - PAIN TYPE: TYPE: ACUTE PAIN

## 2025-08-09 ASSESSMENT — LIFESTYLE VARIABLES
HOW OFTEN DO YOU HAVE A DRINK CONTAINING ALCOHOL: NEVER
HOW MANY STANDARD DRINKS CONTAINING ALCOHOL DO YOU HAVE ON A TYPICAL DAY: PATIENT DOES NOT DRINK

## 2025-08-09 ASSESSMENT — PAIN SCALES - GENERAL: PAINLEVEL_OUTOF10: 8

## 2025-08-09 ASSESSMENT — PAIN - FUNCTIONAL ASSESSMENT: PAIN_FUNCTIONAL_ASSESSMENT: 0-10

## 2025-08-09 ASSESSMENT — PAIN DESCRIPTION - LOCATION: LOCATION: LEG

## 2025-08-09 ASSESSMENT — PAIN DESCRIPTION - DESCRIPTORS: DESCRIPTORS: ACHING;DISCOMFORT;ITCHING

## 2025-08-09 ASSESSMENT — PAIN DESCRIPTION - ORIENTATION: ORIENTATION: LEFT

## 2025-08-10 LAB
ALBUMIN SERPL-MCNC: 3.9 G/DL (ref 3.5–5.2)
ALP SERPL-CCNC: 123 U/L (ref 35–104)
ALT SERPL-CCNC: 16 U/L (ref 0–35)
ANION GAP SERPL CALCULATED.3IONS-SCNC: 14 MMOL/L (ref 7–16)
AST SERPL-CCNC: 32 U/L (ref 0–35)
BASOPHILS # BLD: 0.03 K/UL (ref 0–0.2)
BASOPHILS NFR BLD: 1 % (ref 0–2)
BILIRUB SERPL-MCNC: 0.8 MG/DL (ref 0–1.2)
BUN SERPL-MCNC: 14 MG/DL (ref 8–23)
CALCIUM SERPL-MCNC: 10.1 MG/DL (ref 8.8–10.2)
CHLORIDE SERPL-SCNC: 107 MMOL/L (ref 98–107)
CO2 SERPL-SCNC: 22 MMOL/L (ref 22–29)
CREAT SERPL-MCNC: 1 MG/DL (ref 0.5–1)
EOSINOPHIL # BLD: 0.2 K/UL (ref 0.05–0.5)
EOSINOPHILS RELATIVE PERCENT: 4 % (ref 0–6)
ERYTHROCYTE [DISTWIDTH] IN BLOOD BY AUTOMATED COUNT: 15.2 % (ref 11.5–15)
GFR, ESTIMATED: 61 ML/MIN/1.73M2
GLUCOSE SERPL-MCNC: 143 MG/DL (ref 74–99)
HCT VFR BLD AUTO: 38.7 % (ref 34–48)
HGB BLD-MCNC: 13 G/DL (ref 11.5–15.5)
IMM GRANULOCYTES # BLD AUTO: <0.03 K/UL (ref 0–0.58)
IMM GRANULOCYTES NFR BLD: 0 % (ref 0–5)
LYMPHOCYTES NFR BLD: 1.25 K/UL (ref 1.5–4)
LYMPHOCYTES RELATIVE PERCENT: 27 % (ref 20–42)
MCH RBC QN AUTO: 29.3 PG (ref 26–35)
MCHC RBC AUTO-ENTMCNC: 33.6 G/DL (ref 32–34.5)
MCV RBC AUTO: 87.2 FL (ref 80–99.9)
MONOCYTES NFR BLD: 0.39 K/UL (ref 0.1–0.95)
MONOCYTES NFR BLD: 9 % (ref 2–12)
NEUTROPHILS NFR BLD: 59 % (ref 43–80)
NEUTS SEG NFR BLD: 2.71 K/UL (ref 1.8–7.3)
PLATELET # BLD AUTO: 122 K/UL (ref 130–450)
PMV BLD AUTO: 9.3 FL (ref 7–12)
POTASSIUM SERPL-SCNC: 4.3 MMOL/L (ref 3.5–5.1)
PROT SERPL-MCNC: 7.8 G/DL (ref 6.4–8.3)
RBC # BLD AUTO: 4.44 M/UL (ref 3.5–5.5)
SODIUM SERPL-SCNC: 143 MMOL/L (ref 136–145)
WBC OTHER # BLD: 4.6 K/UL (ref 4.5–11.5)

## 2025-08-10 PROCEDURE — 6360000002 HC RX W HCPCS: Performed by: EMERGENCY MEDICINE

## 2025-08-10 PROCEDURE — 85025 COMPLETE CBC W/AUTO DIFF WBC: CPT

## 2025-08-10 PROCEDURE — 2500000003 HC RX 250 WO HCPCS: Performed by: EMERGENCY MEDICINE

## 2025-08-10 PROCEDURE — 80053 COMPREHEN METABOLIC PANEL: CPT

## 2025-08-10 PROCEDURE — 96374 THER/PROPH/DIAG INJ IV PUSH: CPT

## 2025-08-10 RX ORDER — CEPHALEXIN 500 MG/1
500 CAPSULE ORAL 4 TIMES DAILY
Qty: 28 CAPSULE | Refills: 0 | Status: SHIPPED | OUTPATIENT
Start: 2025-08-10 | End: 2025-08-17

## 2025-08-10 RX ORDER — FUROSEMIDE 20 MG/1
20 TABLET ORAL DAILY
Qty: 5 TABLET | Refills: 0 | Status: SHIPPED | OUTPATIENT
Start: 2025-08-10 | End: 2025-08-15

## 2025-08-10 RX ADMIN — WATER 2000 MG: 1 INJECTION INTRAMUSCULAR; INTRAVENOUS; SUBCUTANEOUS at 00:32

## (undated) DEVICE — SOLUTION IV 1000ML 0.9% SOD CHL PH 5 INJ USP VIAFLX PLAS

## (undated) DEVICE — GAUZE,SPONGE,4"X4",16PLY,XRAY,STRL,LF: Brand: MEDLINE

## (undated) DEVICE — GRADUATE TRIANG MEASURE 1000ML BLK PRNT

## (undated) DEVICE — DOUBLE BASIN SET: Brand: MEDLINE INDUSTRIES, INC.

## (undated) DEVICE — SOLUTION IV IRRIG 500ML 0.9% SODIUM CHL 2F7123

## (undated) DEVICE — PAD,SANITARY,11 IN,MAXI,N-STRL,IND WRAP: Brand: MEDLINE

## (undated) DEVICE — TRAY SET D

## (undated) DEVICE — GLOVE SURG SZ 65 L12IN FNGR THK79MIL GRN LTX FREE

## (undated) DEVICE — PAD,NON-ADHERENT,2X3,STERILE,LF,1/PK: Brand: MEDLINE

## (undated) DEVICE — COVER HNDL LT DISP

## (undated) DEVICE — BAG PRSS INFUS 1000ML 2 PRSS SFTY VLV RIG HNGR SLIP EASILY

## (undated) DEVICE — DRAPE,REIN 53X77,STERILE: Brand: MEDLINE

## (undated) DEVICE — TOWEL,OR,DSP,ST,BLUE,STD,6/PK,12PK/CS: Brand: MEDLINE

## (undated) DEVICE — TRAY,VAG PREP,2PR VNYL GLV,4 C: Brand: MEDLINE INDUSTRIES, INC.

## (undated) DEVICE — 4-PORT MANIFOLD: Brand: NEPTUNE 2

## (undated) DEVICE — SET FLD COLL CLR W/ BLT IN WARN SYS FOR HYSTSCP DOLPHIN

## (undated) DEVICE — MARKER,SKIN,WI/RULER AND LABELS: Brand: MEDLINE

## (undated) DEVICE — COVER,LIGHT HANDLE,FLX,2/PK: Brand: MEDLINE INDUSTRIES, INC.

## (undated) DEVICE — INSTRUMENT HYSTERSCOPE 2.9 DEGREE REUSABLE

## (undated) DEVICE — BLOCK BITE 60FR RUBBER ADLT DENTAL

## (undated) DEVICE — SPONGE GZ W4XL4IN RAYON POLY FILL CVR W/ NONWOVEN FAB

## (undated) DEVICE — GOWN,SIRUS,FABRNF,XL,20/CS: Brand: MEDLINE

## (undated) DEVICE — SET HYSTEROSCOPE STORZ

## (undated) DEVICE — CAMERA STRYKER 1488

## (undated) DEVICE — Y-TYPE TUR/BLADDER IRRIGATION SET, REGULATING CLAMP

## (undated) DEVICE — LEGGINGS, PAIR, 31X48, STERILE: Brand: MEDLINE

## (undated) DEVICE — JELLY,LUBE,STERILE,FLIP TOP,TUBE,2-OZ: Brand: MEDLINE